# Patient Record
Sex: FEMALE | Race: WHITE | Employment: FULL TIME | URBAN - METROPOLITAN AREA
[De-identification: names, ages, dates, MRNs, and addresses within clinical notes are randomized per-mention and may not be internally consistent; named-entity substitution may affect disease eponyms.]

---

## 2017-01-19 ENCOUNTER — HOSPITAL ENCOUNTER (OUTPATIENT)
Dept: LAB | Age: 62
Discharge: HOME | End: 2017-01-19

## 2017-01-19 PROCEDURE — 88305 TISSUE EXAM BY PATHOLOGIST: CPT | Performed by: INTERNAL MEDICINE

## 2018-09-06 PROBLEM — R06.00 DYSPNEA: Status: ACTIVE | Noted: 2018-09-06

## 2018-09-06 PROBLEM — I10 ESSENTIAL HYPERTENSION WITH GOAL BLOOD PRESSURE LESS THAN 130/85: Status: ACTIVE | Noted: 2018-09-06

## 2018-09-06 PROBLEM — E11.9 TYPE 2 DIABETES MELLITUS WITHOUT COMPLICATION, WITHOUT LONG-TERM CURRENT USE OF INSULIN (HCC): Status: ACTIVE | Noted: 2018-09-06

## 2018-09-06 PROBLEM — R94.31 ABNORMAL EKG: Status: ACTIVE | Noted: 2018-09-06

## 2018-09-06 PROBLEM — E78.2 MIXED HYPERLIPIDEMIA: Status: ACTIVE | Noted: 2018-09-06

## 2018-09-06 PROBLEM — I47.9 PAROXYSMAL TACHYCARDIA (HCC): Status: ACTIVE | Noted: 2018-09-06

## 2018-09-07 ENCOUNTER — HOSPITAL ENCOUNTER (OUTPATIENT)
Dept: LAB | Age: 63
Discharge: HOME | End: 2018-09-07
Payer: COMMERCIAL

## 2018-09-07 DIAGNOSIS — R06.00 DYSPNEA, UNSPECIFIED TYPE: ICD-10-CM

## 2018-09-07 LAB
ANION GAP SERPL CALC-SCNC: 8 MMOL/L (ref 7–16)
BASOPHILS # BLD: 0 K/UL (ref 0–0.2)
BASOPHILS NFR BLD: 1 % (ref 0–2)
BUN SERPL-MCNC: 19 MG/DL (ref 8–23)
CALCIUM SERPL-MCNC: 9.7 MG/DL (ref 8.3–10.4)
CHLORIDE SERPL-SCNC: 106 MMOL/L (ref 98–107)
CO2 SERPL-SCNC: 28 MMOL/L (ref 21–32)
CREAT SERPL-MCNC: 0.79 MG/DL (ref 0.6–1)
DIFFERENTIAL METHOD BLD: ABNORMAL
EOSINOPHIL # BLD: 0.3 K/UL (ref 0–0.8)
EOSINOPHIL NFR BLD: 5 % (ref 0.5–7.8)
ERYTHROCYTE [DISTWIDTH] IN BLOOD BY AUTOMATED COUNT: 14.9 %
GLUCOSE SERPL-MCNC: 104 MG/DL (ref 65–100)
HCT VFR BLD AUTO: 40 % (ref 35.8–46.3)
HGB BLD-MCNC: 12.3 G/DL (ref 11.7–15.4)
IMM GRANULOCYTES # BLD: 0 K/UL (ref 0–0.5)
IMM GRANULOCYTES NFR BLD AUTO: 0 % (ref 0–5)
LYMPHOCYTES # BLD: 2 K/UL (ref 0.5–4.6)
LYMPHOCYTES NFR BLD: 30 % (ref 13–44)
MCH RBC QN AUTO: 27 PG (ref 26.1–32.9)
MCHC RBC AUTO-ENTMCNC: 30.8 G/DL (ref 31.4–35)
MCV RBC AUTO: 87.7 FL (ref 79.6–97.8)
MONOCYTES # BLD: 0.5 K/UL (ref 0.1–1.3)
MONOCYTES NFR BLD: 8 % (ref 4–12)
NEUTS SEG # BLD: 3.6 K/UL (ref 1.7–8.2)
NEUTS SEG NFR BLD: 56 % (ref 43–78)
NRBC # BLD: 0 K/UL (ref 0–0.2)
PLATELET # BLD AUTO: 391 K/UL (ref 150–450)
PMV BLD AUTO: 9.5 FL (ref 9.4–12.3)
POTASSIUM SERPL-SCNC: 4 MMOL/L (ref 3.5–5.1)
RBC # BLD AUTO: 4.56 M/UL (ref 4.05–5.2)
SODIUM SERPL-SCNC: 142 MMOL/L (ref 136–145)
WBC # BLD AUTO: 6.4 K/UL (ref 4.3–11.1)

## 2018-09-07 PROCEDURE — 36415 COLL VENOUS BLD VENIPUNCTURE: CPT

## 2018-09-07 PROCEDURE — 85025 COMPLETE CBC W/AUTO DIFF WBC: CPT

## 2018-09-07 PROCEDURE — 80048 BASIC METABOLIC PNL TOTAL CA: CPT

## 2018-09-11 ENCOUNTER — HOSPITAL ENCOUNTER (OUTPATIENT)
Dept: CARDIAC CATH/INVASIVE PROCEDURES | Age: 63
Setting detail: OBSERVATION
Discharge: HOME | End: 2018-09-12
Attending: INTERNAL MEDICINE | Admitting: INTERNAL MEDICINE
Payer: COMMERCIAL

## 2018-09-11 PROBLEM — I50.22 CHRONIC SYSTOLIC CHF (CONGESTIVE HEART FAILURE) (HCC): Status: ACTIVE | Noted: 2018-09-11

## 2018-09-11 LAB
ACT BLD: 791 SECS (ref 70–128)
ATRIAL RATE: 73 BPM
ATRIAL RATE: 74 BPM
CALCULATED P AXIS, ECG09: 57 DEGREES
CALCULATED P AXIS, ECG09: 64 DEGREES
CALCULATED R AXIS, ECG10: 21 DEGREES
CALCULATED R AXIS, ECG10: 27 DEGREES
CALCULATED T AXIS, ECG11: 106 DEGREES
CALCULATED T AXIS, ECG11: 92 DEGREES
DIAGNOSIS, 93000: NORMAL
DIAGNOSIS, 93000: NORMAL
GLUCOSE BLD STRIP.AUTO-MCNC: 133 MG/DL (ref 65–100)
GLUCOSE BLD STRIP.AUTO-MCNC: 172 MG/DL (ref 65–100)
GLUCOSE BLD STRIP.AUTO-MCNC: 94 MG/DL (ref 65–100)
P-R INTERVAL, ECG05: 160 MS
P-R INTERVAL, ECG05: 178 MS
Q-T INTERVAL, ECG07: 386 MS
Q-T INTERVAL, ECG07: 392 MS
QRS DURATION, ECG06: 94 MS
QRS DURATION, ECG06: 94 MS
QTC CALCULATION (BEZET), ECG08: 425 MS
QTC CALCULATION (BEZET), ECG08: 435 MS
VENTRICULAR RATE, ECG03: 73 BPM
VENTRICULAR RATE, ECG03: 74 BPM

## 2018-09-11 PROCEDURE — C1725 CATH, TRANSLUMIN NON-LASER: HCPCS

## 2018-09-11 PROCEDURE — C1894 INTRO/SHEATH, NON-LASER: HCPCS

## 2018-09-11 PROCEDURE — 74011250637 HC RX REV CODE- 250/637: Performed by: INTERNAL MEDICINE

## 2018-09-11 PROCEDURE — 93572 IV DOP VEL&/PRESS C FLO EA: CPT

## 2018-09-11 PROCEDURE — 93458 L HRT ARTERY/VENTRICLE ANGIO: CPT

## 2018-09-11 PROCEDURE — 74011000258 HC RX REV CODE- 258: Performed by: INTERNAL MEDICINE

## 2018-09-11 PROCEDURE — 77030012468 HC VLV BLEEDBK CNTRL ABBT -B

## 2018-09-11 PROCEDURE — 99152 MOD SED SAME PHYS/QHP 5/>YRS: CPT

## 2018-09-11 PROCEDURE — C1887 CATHETER, GUIDING: HCPCS

## 2018-09-11 PROCEDURE — C1769 GUIDE WIRE: HCPCS

## 2018-09-11 PROCEDURE — 99218 HC RM OBSERVATION: CPT

## 2018-09-11 PROCEDURE — 85347 COAGULATION TIME ACTIVATED: CPT

## 2018-09-11 PROCEDURE — 99153 MOD SED SAME PHYS/QHP EA: CPT

## 2018-09-11 PROCEDURE — 74011636637 HC RX REV CODE- 636/637: Performed by: INTERNAL MEDICINE

## 2018-09-11 PROCEDURE — C1874 STENT, COATED/COV W/DEL SYS: HCPCS

## 2018-09-11 PROCEDURE — 77030013794 HC KT TRNSDUC BLD EDWD -B

## 2018-09-11 PROCEDURE — 77030002888 HC SUT CHRMC J&J -A

## 2018-09-11 PROCEDURE — 77030004559 HC CATH ANGI DX SUPT CARD -B

## 2018-09-11 PROCEDURE — 74011250636 HC RX REV CODE- 250/636

## 2018-09-11 PROCEDURE — 74011250636 HC RX REV CODE- 250/636: Performed by: INTERNAL MEDICINE

## 2018-09-11 PROCEDURE — 74011636320 HC RX REV CODE- 636/320: Performed by: INTERNAL MEDICINE

## 2018-09-11 PROCEDURE — 92928 PRQ TCAT PLMT NTRAC ST 1 LES: CPT

## 2018-09-11 PROCEDURE — 82962 GLUCOSE BLOOD TEST: CPT

## 2018-09-11 PROCEDURE — 74011000250 HC RX REV CODE- 250: Performed by: INTERNAL MEDICINE

## 2018-09-11 PROCEDURE — 93571 IV DOP VEL&/PRESS C FLO 1ST: CPT

## 2018-09-11 PROCEDURE — 77030004558 HC CATH ANGI DX SUPR TORQ CARD -A

## 2018-09-11 PROCEDURE — 93005 ELECTROCARDIOGRAM TRACING: CPT | Performed by: INTERNAL MEDICINE

## 2018-09-11 RX ORDER — HEPARIN SODIUM 200 [USP'U]/100ML
3 INJECTION, SOLUTION INTRAVENOUS CONTINUOUS
Status: DISCONTINUED | OUTPATIENT
Start: 2018-09-11 | End: 2018-09-11

## 2018-09-11 RX ORDER — PRAVASTATIN SODIUM 20 MG/1
20 TABLET ORAL
Status: DISCONTINUED | OUTPATIENT
Start: 2018-09-11 | End: 2018-09-12

## 2018-09-11 RX ORDER — SODIUM CHLORIDE 0.9 % (FLUSH) 0.9 %
5-10 SYRINGE (ML) INJECTION AS NEEDED
Status: DISCONTINUED | OUTPATIENT
Start: 2018-09-11 | End: 2018-09-12 | Stop reason: HOSPADM

## 2018-09-11 RX ORDER — MORPHINE SULFATE 2 MG/ML
2 INJECTION, SOLUTION INTRAMUSCULAR; INTRAVENOUS
Status: DISCONTINUED | OUTPATIENT
Start: 2018-09-11 | End: 2018-09-12 | Stop reason: HOSPADM

## 2018-09-11 RX ORDER — SODIUM CHLORIDE 9 MG/ML
75 INJECTION, SOLUTION INTRAVENOUS CONTINUOUS
Status: DISPENSED | OUTPATIENT
Start: 2018-09-11 | End: 2018-09-12

## 2018-09-11 RX ORDER — INSULIN LISPRO 100 [IU]/ML
INJECTION, SOLUTION INTRAVENOUS; SUBCUTANEOUS
Status: DISCONTINUED | OUTPATIENT
Start: 2018-09-11 | End: 2018-09-12 | Stop reason: HOSPADM

## 2018-09-11 RX ORDER — MIDAZOLAM HYDROCHLORIDE 1 MG/ML
.5-2 INJECTION, SOLUTION INTRAMUSCULAR; INTRAVENOUS
Status: DISCONTINUED | OUTPATIENT
Start: 2018-09-11 | End: 2018-09-11

## 2018-09-11 RX ORDER — GUAIFENESIN 100 MG/5ML
81 LIQUID (ML) ORAL ONCE
Status: ACTIVE | OUTPATIENT
Start: 2018-09-11 | End: 2018-09-12

## 2018-09-11 RX ORDER — CARVEDILOL 3.12 MG/1
3.12 TABLET ORAL 2 TIMES DAILY WITH MEALS
Status: DISCONTINUED | OUTPATIENT
Start: 2018-09-11 | End: 2018-09-12 | Stop reason: HOSPADM

## 2018-09-11 RX ORDER — SODIUM CHLORIDE 0.9 % (FLUSH) 0.9 %
5-10 SYRINGE (ML) INJECTION EVERY 8 HOURS
Status: DISCONTINUED | OUTPATIENT
Start: 2018-09-11 | End: 2018-09-12 | Stop reason: HOSPADM

## 2018-09-11 RX ORDER — FENTANYL CITRATE 50 UG/ML
25-100 INJECTION, SOLUTION INTRAMUSCULAR; INTRAVENOUS
Status: DISCONTINUED | OUTPATIENT
Start: 2018-09-11 | End: 2018-09-11

## 2018-09-11 RX ORDER — NITROGLYCERIN 0.4 MG/1
0.4 TABLET SUBLINGUAL
Status: DISCONTINUED | OUTPATIENT
Start: 2018-09-11 | End: 2018-09-12 | Stop reason: HOSPADM

## 2018-09-11 RX ORDER — DIAZEPAM 5 MG/1
5 TABLET ORAL AS NEEDED
Status: DISCONTINUED | OUTPATIENT
Start: 2018-09-11 | End: 2018-09-12 | Stop reason: HOSPADM

## 2018-09-11 RX ORDER — LORAZEPAM 1 MG/1
1 TABLET ORAL
Status: DISCONTINUED | OUTPATIENT
Start: 2018-09-11 | End: 2018-09-12 | Stop reason: HOSPADM

## 2018-09-11 RX ORDER — LIDOCAINE HYDROCHLORIDE 10 MG/ML
20-300 INJECTION INFILTRATION; PERINEURAL
Status: DISCONTINUED | OUTPATIENT
Start: 2018-09-11 | End: 2018-09-11

## 2018-09-11 RX ORDER — SODIUM CHLORIDE 9 MG/ML
75 INJECTION, SOLUTION INTRAVENOUS CONTINUOUS
Status: DISCONTINUED | OUTPATIENT
Start: 2018-09-11 | End: 2018-09-11 | Stop reason: SDUPTHER

## 2018-09-11 RX ORDER — PRAVASTATIN SODIUM 20 MG/1
40 TABLET ORAL
Status: DISCONTINUED | OUTPATIENT
Start: 2018-09-11 | End: 2018-09-11 | Stop reason: SDUPTHER

## 2018-09-11 RX ORDER — ACETAMINOPHEN 325 MG/1
650 TABLET ORAL
Status: DISCONTINUED | OUTPATIENT
Start: 2018-09-11 | End: 2018-09-12 | Stop reason: HOSPADM

## 2018-09-11 RX ORDER — ATROPINE SULFATE 0.1 MG/ML
INJECTION INTRAVENOUS
Status: ACTIVE
Start: 2018-09-11 | End: 2018-09-12

## 2018-09-11 RX ORDER — LISINOPRIL 5 MG/1
5 TABLET ORAL 2 TIMES DAILY
Status: DISCONTINUED | OUTPATIENT
Start: 2018-09-11 | End: 2018-09-12 | Stop reason: HOSPADM

## 2018-09-11 RX ORDER — GUAIFENESIN 100 MG/5ML
81 LIQUID (ML) ORAL DAILY
Status: DISCONTINUED | OUTPATIENT
Start: 2018-09-12 | End: 2018-09-12 | Stop reason: HOSPADM

## 2018-09-11 RX ADMIN — Medication 10 ML: at 22:23

## 2018-09-11 RX ADMIN — BIVALIRUDIN 1.75 MG/KG/HR: 250 INJECTION, POWDER, LYOPHILIZED, FOR SOLUTION INTRAVENOUS at 13:59

## 2018-09-11 RX ADMIN — ACETAMINOPHEN 650 MG: 325 TABLET ORAL at 23:30

## 2018-09-11 RX ADMIN — PRAVASTATIN SODIUM 20 MG: 20 TABLET ORAL at 22:24

## 2018-09-11 RX ADMIN — LISINOPRIL 5 MG: 5 TABLET ORAL at 17:38

## 2018-09-11 RX ADMIN — SODIUM CHLORIDE 75 ML/HR: 900 INJECTION, SOLUTION INTRAVENOUS at 12:25

## 2018-09-11 RX ADMIN — INSULIN LISPRO 3 UNITS: 100 INJECTION, SOLUTION INTRAVENOUS; SUBCUTANEOUS at 22:23

## 2018-09-11 RX ADMIN — TICAGRELOR 180 MG: 90 TABLET ORAL at 14:32

## 2018-09-11 RX ADMIN — NITROGLYCERIN 0.15 MG: 200 INJECTION, SOLUTION INTRAVENOUS at 14:10

## 2018-09-11 RX ADMIN — NITROGLYCERIN 0.15 MG: 200 INJECTION, SOLUTION INTRAVENOUS at 14:01

## 2018-09-11 RX ADMIN — FENTANYL CITRATE 25 MCG: 50 INJECTION, SOLUTION INTRAMUSCULAR; INTRAVENOUS at 13:38

## 2018-09-11 RX ADMIN — LIDOCAINE HYDROCHLORIDE 7 ML: 10 INJECTION, SOLUTION INFILTRATION; PERINEURAL at 13:37

## 2018-09-11 RX ADMIN — CARVEDILOL 3.12 MG: 3.12 TABLET, FILM COATED ORAL at 17:38

## 2018-09-11 RX ADMIN — IOPAMIDOL 220 ML: 755 INJECTION, SOLUTION INTRAVENOUS at 14:31

## 2018-09-11 RX ADMIN — FENTANYL CITRATE 25 MCG: 50 INJECTION, SOLUTION INTRAMUSCULAR; INTRAVENOUS at 14:00

## 2018-09-11 RX ADMIN — MIDAZOLAM HYDROCHLORIDE 1 MG: 1 INJECTION, SOLUTION INTRAMUSCULAR; INTRAVENOUS at 13:38

## 2018-09-11 RX ADMIN — MIDAZOLAM HYDROCHLORIDE 1 MG: 1 INJECTION, SOLUTION INTRAMUSCULAR; INTRAVENOUS at 13:30

## 2018-09-11 RX ADMIN — FENTANYL CITRATE 25 MCG: 50 INJECTION, SOLUTION INTRAMUSCULAR; INTRAVENOUS at 13:30

## 2018-09-11 RX ADMIN — HEPARIN SODIUM 3 ML/HR: 5000 INJECTION, SOLUTION INTRAVENOUS; SUBCUTANEOUS at 13:15

## 2018-09-11 NOTE — PROGRESS NOTES
Patient pre-assessment complete for [facility] poss with DR Choi scheduled for 18 at 2pm, arrival time 12pm. Patient verified using . Patient instructed to bring all home medications in labeled bottles on the day of procedure. NPO status reinforced. Patient informed to take a full dose aspirin 325mg  or 81 mg x 4 on the day of procedure. Patient instructed to HOLD metformin (last dose 9/10/18am) & HOLD HCTZ in am . Instructed they can take all other medications excluding vitamins & supplements. Patient verbalizes understanding of all instructions & denies any questions at this time.

## 2018-09-11 NOTE — PROGRESS NOTES
TRANSFER - OUT REPORT: 
 
Verbal report given to TING Polk(name) on Alessia Banda  being transferred to telemetry room 326(unit) for routine progression of care Report consisted of patients Situation, Background, Assessment and  
Recommendations(SBAR). Information from the following report(s) Procedure Summary was reviewed with the receiving nurse. Lines:  
Peripheral IV 09/11/18 Left Antecubital (Active) Peripheral IV 09/11/18 Right Antecubital (Active) Opportunity for questions and clarification was provided. Patient transported with: 
 Monitor

## 2018-09-11 NOTE — PROGRESS NOTES
R posterior tibial pulse was +1 and R dorsalis pedis pulse was +1 prior to sheath removal. 6FR arterial sheath removed from R groin using sterile technique at 1645. Manual pressure held over site for 20 minutes minutes. Hemostasis achieved at 1705. R groin without bleeding without hematoma. Sterile gauze placed over site and secured with tegaderm. 5lb sandbag placed over site x 4 hours. Patient instructed to keep R leg straight and still and head on pillow. Patient verbalizes understanding.   R posterior tibial pulse was +1 and R dorsalis pedis pulse was +1 after sheath removal.

## 2018-09-11 NOTE — PROGRESS NOTES
TRANSFER - IN REPORT: 
 
Verbal report received from TING Arora(name) on Golden Sawant  being received from cath lab(unit) for routine progression of care Report consisted of patients Situation, Background, Assessment and  
Recommendations(SBAR). Information from the following report(s) Procedure Summary was reviewed with the receiving nurse. Opportunity for questions and clarification was provided. Assessment completed upon patients arrival to unit and care assumed.

## 2018-09-11 NOTE — PROGRESS NOTES
TRANSFER - OUT REPORT: 
 
Verbal report given to Hyacinth Arevalo RN (name) on Job Li  being [place] [address] [place] [address] [place] [place] Report consisted of patients Situation, Background, Assessment and  
Recommendations(SBAR). Information from the following report(s) SBAR was reviewed with the receiving nurse. Lines:  
Peripheral IV 09/11/18 Left Antecubital (Active) Peripheral IV 09/11/18 Right Antecubital (Active) Opportunity for questions and clarification was provided. Patient transported with: 
 [place] Pt had LHC via RFA. 1 stent placed to ramus and iFR wire utilized. 6fr sheath remains sutured in place and attached to art line. Covered with tegaderm. Pt received Versed 2mg IV, Fentanyl 75mcg IV, Angoimax bolus IV, Angoimax gtt IV ending @ 1436, and Brilinta 180mg PO.

## 2018-09-11 NOTE — PROGRESS NOTES
Patient received to [address] [place] [address] room # 9  Ambulatory from [facility]. Patient scheduled for [facility] today with Dr Fahad Thornton. Procedure reviewed & questions answered, voiced good understanding consent obtained & placed on chart. All medications and medical history reviewed. Will prep patient per orders. Patient & family updated on plan of care. The patient has a fraility score of 3-MANAGING WELL, based on reports recent fatigue

## 2018-09-11 NOTE — PROCEDURES
Brief Cardiac Procedure Note    Patient: Cookie Prescott MRN: [id number]  SSN: [id number]    YOB: 1955  Age: 58 y.o. Sex: female      Date of Procedure: 9/11/2018     Pre-procedure Diagnosis: Congestive Heart Failure    Post-procedure Diagnosis: Coronary Artery Disease    Reason for Procedure: Suspected CAD    Procedure: Left Heart Catheterization with Percutaneous Coronary Intervention    Brief Description of Procedure: IFR/PCI RAMUS    Performed By: Hilaria Morales MD     Assistants: NONE    Anesthesia: Moderate Sedation    Estimated Blood Loss: Less than 10 mL      Specimens: None    Implants: None    Findings:   LV 30%, GLOBAL HK  RAMUS LONG PROX/MID 60% --- iFR 0.87 --- 3.0 X 30 BARBARA, 3.0NC TO 16, 16, 14  LAD MID SMOOTH 50% FOCAL, ---iFR 0.97  LCX AND RCA MILD IRREGS  RIGHT GROIN  MANUAL    Complications: None    Recommendations: Continue medical therapy.     Signed By: Hilaria Morales MD     September 11, 2018

## 2018-09-11 NOTE — PROGRESS NOTES
made initial visit. Pt was alert and verbal.  Pt appeared comfortable with no pain level expressed or observed. Pt's family was present.  welcomed them to DT and shared information about  services.  provided spiritual care through presence, pastoral conversation, and assurance of prayer.

## 2018-09-11 NOTE — IP AVS SNAPSHOT
[address] 
[phone] Patient: Katerin Slade MRN: [id number] :1955 About your hospitalization You were admitted on:  2018 You last received care in the:  [facility] 3 TELEMETRY You were discharged on:  2018 Why you were hospitalized Your primary diagnosis was:  Not on File Your diagnoses also included:  Chronic Systolic Chf (Congestive Heart Failure) (Hcc) Follow-up Information Follow up With Details Comments Contact Info [facility]  We will forward your outpatient referral for Cardiac Rehab to the [facility] as requested. [address] Candy Patel [phone] Kacey Zavaleta MD On 2018 TC-7 at 330 pm in [facility] office  [facility] [address]  [address] [place] [id number] 
[phone] Ann-Marie Thompson MD   [address] 
[phone] Your Scheduled Appointments 2018  3:30 PM EDT TRANSITIONAL CARE MANAGEMENT with Kacey Zavaleta MD  
[facility] ([address] [place] [address]) [address] [place] [address]  
[phone] Discharge Orders Procedure Order Date Status Priority Quantity Spec Type Associated Dx REFERRAL TO CARDIAC REHAB 18 1019 Normal Routine 1 A check yassine indicates which time of day the medication should be taken. My Medications START taking these medications Instructions Each Dose to Equal  
 Morning Noon Evening Bedtime  
 carvedilol 3.125 mg tablet Commonly known as:  Pablito Pintos Take 1 Tab by mouth two (2) times daily (with meals). 3.125 mg  
    
  
   
   
  
   
  
 lisinopril 5 mg tablet Commonly known as:  Tung Cruz Take 1 Tab by mouth two (2) times a day. 5 mg nitroglycerin 0.4 mg SL tablet Commonly known as:  NITROSTAT  
   
 1 Tab by SubLINGual route every five (5) minutes as needed for Chest Pain. Up to 3 doses. 0.4 mg  
    
   
   
   
  
 ticagrelor 90 mg tablet Commonly known as:  [employer] Take 1 Tab by mouth every twelve (12) hours every twelve (12) hours. 90 mg CONTINUE taking these medications Instructions Each Dose to Equal  
 Morning Noon Evening Bedtime  
 aspirin delayed-release 81 mg tablet Take 81 mg by mouth two (2) times a day. 81 mg  
    
  
   
   
  
   
  
 GLUCOPHAGE 1,000 mg tablet Generic drug:  metFORMIN Take 1,000 mg by mouth two (2) times daily (with meals). 1000 mg  
    
  
   
   
  
   
  
 pravastatin 40 mg tablet Commonly known as:  PRAVACHOL Take 40 mg by mouth nightly. 40 mg PRENATAL MULTI PO Take  by mouth daily. STOOL SOFTENER 100 mg capsule Generic drug:  docusate sodium Take 100 mg by mouth two (2) times a day. 100 mg  
    
  
   
   
  
   
  
 traMADol 50 mg tablet Commonly known as:  ULTRAM  
   
 Take 50 mg by mouth two (2) times a day. 50 mg  
    
  
   
   
  
   
  
  
STOP taking these medications   
 hydroCHLOROthiazide 25 mg tablet Commonly known as:  HYDRODIURIL  
   
  
 metoprolol tartrate 25 mg tablet Commonly known as:  LOPRESSOR Where to Get Your Medications Information on where to get these meds will be given to you by the nurse or doctor. ! Ask your nurse or doctor about these medications  
  carvedilol 3.125 mg tablet  
 lisinopril 5 mg tablet  
 nitroglycerin 0.4 mg SL tablet  
 ticagrelor 90 mg tablet Opioid Education  Prescription Opioids: What You Need to Know: 
 
Prescription opioids can be used to help relieve moderate-to-severe pain and are often prescribed following a surgery or injury, or for certain health conditions. These medications can be an important part of treatment but also come with serious risks. Opioids are strong pain medicines. Examples include hydrocodone, oxycodone, fentanyl, and morphine. Heroin is an example of an illegal opioid. It is important to work with your health care provider to make sure you are getting the safest, most effective care. WHAT ARE THE RISKS AND SIDE EFFECTS OF OPIOID USE? Prescription opioids carry serious risks of addiction and overdose, especially with prolonged use. An opioid overdose, often marked by slow breathing, can cause sudden death. The use of prescription opioids can have a number of side effects as well, even when taken as directed. · Tolerance-meaning you might need to take more of a medication for the same pain relief · Physical dependence-meaning you have symptoms of withdrawal when the medication is stopped. Withdrawal symptoms can include nausea, sweating, chills, diarrhea, stomach cramps, and muscle aches. Withdrawal can last up to several weeks, depending on which drug you took and how long you took it. · Increased sensitivity to pain · Constipation · Nausea, vomiting, and dry mouth · Sleepiness and dizziness · Confusion · Depression · Low levels of testosterone that can result in lower sex drive, energy, and strength · Itching and sweating RISKS ARE GREATER WITH:      
· History of drug misuse, substance use disorder, or overdose · Mental health conditions (such as depression or anxiety) · Sleep apnea · Older age (72 years or older) · Pregnancy Avoid alcohol while taking prescription opioids. Also, unless specifically advised by your health care provider, medications to avoid include: · Benzodiazepines (such as Xanax or Valium) · Muscle relaxants (such as Soma or Flexeril) · Hypnotics (such as Ambien or Lunesta) · Other prescription opioids KNOW YOUR OPTIONS Talk to your health care provider about ways to manage your pain that don't involve prescription opioids. Some of these options may actually work better and have fewer risks and side effects. Options may include: 
· Pain relievers such as acetaminophen, ibuprofen, and naproxen · Some medications that are also used for depression or seizures · Physical therapy and exercise · Counseling to help patients learn how to cope better with triggers of pain and stress. · Application of heat or cold compress · Massage therapy · Relaxation techniques Be Informed Make sure you know the name of your medication, how much and how often to take it, and its potential risks & side effects. IF YOU ARE PRESCRIBED OPIOIDS FOR PAIN: 
· Never take opioids in greater amounts or more often than prescribed. Remember the goal is not to be pain-free but to manage your pain at a tolerable level. · Follow up with your primary care provider to: · Work together to create a plan on how to manage your pain. · Talk about ways to help manage your pain that don't involve prescription opioids. · Talk about any and all concerns and side effects. · Help prevent misuse and abuse. · Never sell or share prescription opioids · Help prevent misuse and abuse. · Store prescription opioids in a secure place and out of reach of others (this may include visitors, children, friends, and family). · Safely dispose of unused/unwanted prescription opioids: Find your community drug take-back program or your pharmacy mail-back program, or flush them down the toilet, following guidance from the Food and Drug Administration (www.fda.gov/Drugs/ResourcesForYou). · Visit www.cdc.gov/drugoverdose to learn about the risks of opioid abuse and overdose. · If you believe you may be struggling with addiction, tell your health care provider and ask for guidance or call [employer] at [phone]. Discharge Instructions Resume metformin on 9/14/18 DISPOSITION: The patient is being discharged home in stable condition on a low saturated fat, low cholesterol and low salt diet. The patient is instructed to advance activities as tolerated to the limit of fatigue or shortness of breath. The patient is instructed to avoid all heavy lifting, straining, stooping or squatting for 3-5 days. The patient is instructed to watch the cath site for bleeding/oozing; if seen, the patient is instructed to apply firm pressure with a clean cloth and call [address] [place] [address] 121 Cardiology at [phone]. The patient is instructed to watch for signs of infection which include: increasing area of redness, fever/hot to touch or purulent drainage at the catheterization site. The patient is instructed not to soak in a bathtub for 7-10 days, but is cleared to shower. The patient is instructed to call the office or return to the ER for immediate evaluation for any shortness of breath or chest pain not relieved by NTG. Percutaneous Coronary Intervention: What to Expect at [facility] Your Recovery Percutaneous coronary intervention (PCI) is the name for procedures that are used to open a narrowed or blocked coronary artery. The two most common PCI procedures are coronary angioplasty and coronary stent placement. Your groin or arm may have a bruise and feel sore for a day or two after a percutaneous coronary intervention (PCI). You can do light activities around the house, but nothing strenuous for several days. This care sheet gives you a general idea about how long it will take for you to recover. But each person recovers at a different pace. Follow the steps below to get better as quickly as possible. How can you care for yourself at home? Activity 
  · If the doctor gave you a sedative: ¨ For 24 hours, don't do anything that requires attention to detail.  It takes time for the medicine's effects to completely wear off. ¨ For your safety, do not drive or operate any machinery that could be dangerous. Wait until the medicine wears off and you can think clearly and react easily.  
  · Do not do strenuous exercise and do not lift, pull, or push anything heavy until your doctor says it is okay. This may be for a day or two. You can walk around the house and do light activity, such as cooking.  
  · If the catheter was placed in your groin, try not to walk up stairs for the first couple of days.  
  · If the catheter was placed in your arm near your wrist, do not bend your wrist deeply for the first couple of days. Be careful using your hand to get into and out of a chair or bed.  
  · Carry your stent identification card with you at all times.  
  · If your doctor recommends it, get more exercise. Walking is a good choice. Bit by bit, increase the amount you walk every day. Try for at least 30 minutes on most days of the week. Diet 
  · Drink plenty of fluids to help your body flush out the dye. If you have kidney, heart, or liver disease and have to limit fluids, talk with your doctor before you increase the amount of fluids you drink.  
  · Keep eating a heart-healthy diet that has lots of fruits, vegetables, and whole grains. If you have not been eating this way, talk to your doctor. You also may want to talk to a dietitian. This expert can help you to learn about healthy foods and plan meals. Medicines 
  · Your doctor will tell you if and when you can restart your medicines. He or she will also give you instructions about taking any new medicines.  
  · If you take blood thinners, such as warfarin (Coumadin), clopidogrel (Plavix), or aspirin, be sure to talk to your doctor. He or she will tell you if and when to start taking those medicines again. Make sure that you understand exactly what your doctor wants you to do.   · Your doctor will prescribe blood-thinning medicines. You will likely take aspirin plus another antiplatelet, such as clopidogrel (Plavix). It is very important that you take these medicines exactly as directed. These medicines help keep the coronary artery open and reduce your risk of a heart attack.  
  · Call your doctor if you think you are having a problem with your medicine.  
 Care of the catheter site 
  · For 1 or 2 days, keep a bandage over the spot where the catheter was inserted. The bandage probably will fall off in this time.  
  · Put ice or a cold pack on the area for 10 to 20 minutes at a time to help with soreness or swelling. Put a thin cloth between the ice and your skin.  
  · You may shower 24 to 48 hours after the procedure, if your doctor okays it. Pat the incision dry.  
  · Do not soak the catheter site until it is healed. Don't take a bath for 1 week, or until your doctor tells you it is okay.  
  · If you are bleeding, lie down and press on the area for 15 minutes to try to make it stop. If the bleeding does not stop, call your doctor or seek immediate medical care. Follow-up care is a key part of your treatment and safety. Be sure to make and go to all appointments, and call your doctor if you are having problems. It's also a good idea to know your test results and keep a list of the medicines you take. When should you call for help? Call 911 anytime you think you may need emergency care. For example, call if: 
  · You passed out (lost consciousness).  
  · You have severe trouble breathing.  
  · You have sudden chest pain and shortness of breath, or you cough up blood.  
  · You have symptoms of a heart attack, such as: ¨ Chest pain or pressure. ¨ Sweating. ¨ Shortness of breath. ¨ Nausea or vomiting. ¨ Pain that spreads from the chest to the neck, jaw, or one or both shoulders or arms. ¨ Dizziness or lightheadedness. ¨ A fast or uneven pulse. After calling 911, chew 1 adult-strength aspirin. Wait for an ambulance. Do not try to drive yourself.  
  · You have been diagnosed with angina, and you have angina symptoms that do not go away with rest or are not getting better within 5 minutes after you take one dose of nitroglycerin.  
 Call your doctor now or seek immediate medical care if: 
  · You are bleeding from the area where the catheter was put in your artery.  
  · You have a fast-growing, painful lump at the catheter site.  
  · You have signs of infection, such as: 
¨ Increased pain, swelling, warmth, or redness. ¨ Red streaks leading from the catheter site. ¨ Pus draining from the catheter site. ¨ A fever.  
  · Your leg or arm looks blue or feels cold, numb, or tingly.  
 Watch closely for changes in your health, and be sure to contact your doctor if you have any problems. Where can you learn more? Go to http://sheri-reynaldo.info/. Enter D680 in the search box to learn more about \"Percutaneous Coronary Intervention: What to Expect at Home. \" Current as of: December 6, 2017 Content Version: 11.7 © 0864-1890 [employer]. Care instructions adapted under license by [employer] (which disclaims liability or warranty for this information). If you have questions about a medical condition or this instruction, always ask your healthcare professional. [address] any warranty or liability for your use of this information. Cardiac Catheterization/Angiography Discharge Instructions *Check the puncture site frequently for swelling or bleeding. If you see any bleeding, lie down and apply pressure over the area with a clean town or washcloth. Notify your doctor for any redness, swelling, drainage or oozing from the puncture site. Notify your doctor for any fever or chills.  
 
*If the leg or arm with the puncture becomes cold, numb or painful, call [facility]iology at  [phone]. *Activity should be limited for the next 48 hours. Climb stairs as little as possible and avoid any stooping, bending or strenuous activity for 48 hours. No heavy lifting (anything over 10 pounds) for three days. *Do not drive for 48 hours. *You may resume your usual diet. Drink more fluids than usual. 
 
*Have a responsible person drive you home and stay with you for at least 24 hours after your heart catheterization/angiography. *You may remove the bandage from your Right Arm in 24 hours. You may shower in 24 hours. No tub baths, hot tubs or swimming for one week. Do not place any lotions, creams, powders, ointments over the puncture site for one week. You may place a clean band-aid over the puncture site each day for 5 days. Change this daily. Heart Failure: Care Instructions Your Care Instructions Heart failure occurs when your heart does not pump as much blood as the body needs. Failure does not mean that the heart has stopped pumping but rather that it is not pumping as well as it should. Over time, this causes fluid buildup in your lungs and other parts of your body. Fluid buildup can cause shortness of breath, fatigue, swollen ankles, and other problems. By taking medicines regularly, reducing sodium (salt) in your diet, checking your weight every day, and making lifestyle changes, you can feel better and live longer. Follow-up care is a key part of your treatment and safety. Be sure to make and go to all appointments, and call your doctor if you are having problems. It's also a good idea to know your test results and keep a list of the medicines you take. How can you care for yourself at home? Medicines 
  · Be safe with medicines. Take your medicines exactly as prescribed.  Call your doctor if you think you are having a problem with your medicine.  
  · Do not take any vitamins, over-the-counter medicine, or herbal products without talking to your doctor first. Charlotte Valencia not take ibuprofen (Advil or Motrin) and naproxen (Aleve) without talking to your doctor first. They could make your heart failure worse.  
  · You may be taking some of the following medicine. ¨ Beta-blockers can slow heart rate, decrease blood pressure, and improve your condition. Taking a beta-blocker may lower your chance of needing to be hospitalized. ¨ Angiotensin-converting enzyme inhibitors (ACEIs) reduce the heart's workload, lower blood pressure, and reduce swelling. Taking an ACEI may lower your chance of needing to be hospitalized again. ¨ Angiotensin II receptor blockers (ARBs) work like ACEIs. Your doctor may prescribe them instead of ACEIs. ¨ Diuretics, also called water pills, reduce swelling. ¨ Potassium supplements replace this important mineral, which is sometimes lost with diuretics. ¨ Aspirin and other blood thinners prevent blood clots, which can cause a stroke or heart attack.  
 You will get more details on the specific medicines your doctor prescribes. Diet 
  · Your doctor may suggest that you limit sodium to 2,000 milligrams (mg) a day or less. That is less than 1 teaspoon of salt a day, including all the salt you eat in cooking or in packaged foods. People get most of their sodium from processed foods. Fast food and restaurant meals also tend to be very high in sodium.  
  · Ask your doctor how much liquid you can drink each day. You may have to limit liquids.  
 Weight 
  · Weigh yourself without clothing at the same time each day. Record your weight. Call your doctor if you have a sudden weight gain, such as more than 2 to 3 pounds in a day or 5 pounds in a week. (Your doctor may suggest a different range of weight gain.) A sudden weight gain may mean that your heart failure is getting worse.  
 Activity level 
  · Start light exercise (if your doctor says it is okay).  Even if you can only do a small amount, exercise will help you get stronger, have more energy, and manage your weight and your stress. Walking is an easy way to get exercise. Start out by walking a little more than you did before. Bit by bit, increase the amount you walk.  
  · When you exercise, watch for signs that your heart is working too hard. You are pushing yourself too hard if you cannot talk while you are exercising. If you become short of breath or dizzy or have chest pain, stop, sit down, and rest.  
  · If you feel \"wiped out\" the day after you exercise, walk slower or for a shorter distance until you can work up to a better pace.  
  · Get enough rest at night. Sleeping with 1 or 2 pillows under your upper body and head may help you breathe easier.  
 Lifestyle changes 
  · Do not smoke. Smoking can make a heart condition worse. If you need help quitting, talk to your doctor about stop-smoking programs and medicines. These can increase your chances of quitting for good. Quitting smoking may be the most important step you can take to protect your heart.  
  · Limit alcohol to 2 drinks a day for men and 1 drink a day for women. Too much alcohol can cause health problems.  
  · Avoid getting sick from colds and the flu. Get a pneumococcal vaccine shot. If you have had one before, ask your doctor whether you need another dose. Get a flu shot each year. If you must be around people with colds or the flu, wash your hands often. When should you call for help?  
Call 911 if you have symptoms of sudden heart failure such as: 
  · You have severe trouble breathing.  
  · You cough up pink, foamy mucus.  
  · You have a new irregular or rapid heartbeat.  
 Call your doctor now or seek immediate medical care if: 
  · You have new or increased shortness of breath.  
  · You are dizzy or lightheaded, or you feel like you may faint.  
  · You have sudden weight gain, such as more than 2 to 3 pounds in a day or 5 pounds in a week. (Your doctor may suggest a different range of weight gain.)  
  · You have increased swelling in your legs, ankles, or feet.  
  · You are suddenly so tired or weak that you cannot do your usual activities.  
 Watch closely for changes in your health, and be sure to contact your doctor if you develop new symptoms. Avoiding Triggers With Heart Failure: Care Instructions Your Care Instructions Triggers are anything that make your heart failure flare up. A flare-up is also called \"sudden heart failure\" or \"acute heart failure. \" When you have a flare-up, fluid builds up in your lungs, and you have problems breathing. You might need to go to the hospital. By watching for changes in your condition and avoiding triggers, you can prevent heart failure flare-ups. Follow-up care is a key part of your treatment and safety. Be sure to make and go to all appointments, and call your doctor if you are having problems. It's also a good idea to know your test results and keep a list of the medicines you take. How can you care for yourself at home? Watch for changes in your weight and condition · Weigh yourself without clothing at the same time each day. Record your weight. Call your doctor if you have sudden weight gain, such as more than 2 to 3 pounds in a day or 5 pounds in a week. (Your doctor may suggest a different range of weight gain.) A sudden weight gain may mean that your heart failure is getting worse. · Keep a daily record of your symptoms. Write down any changes in how you feel, such as new shortness of breath, cough, or problems eating. Also record if your ankles are more swollen than usual and if you feel more tired than usual. Note anything that you ate or did that could have triggered these changes. Limit sodium Sodium causes your body to hold on to extra water. This may cause your heart failure symptoms to get worse.  People get most of their sodium from processed foods. Fast food and restaurant meals also tend to be very high in sodium. · Your doctor may suggest that you limit sodium to 2,000 milligrams (mg) a day or less. That is less than 1 teaspoon of salt a day, including all the salt you eat in cooking or in packaged foods. · Read food labels on cans and food packages. They tell you how much sodium you get in one serving. Check the serving size. If you eat more than one serving, you are getting more sodium. · Be aware that sodium can come in forms other than salt, including monosodium glutamate (MSG), sodium citrate, and sodium bicarbonate (baking soda). MSG is often added to Asian food. You can sometimes ask for food without MSG or salt. · Slowly reducing salt will help you adjust to the taste. Take the salt shaker off the table. · Flavor your food with garlic, lemon juice, onion, vinegar, herbs, and spices instead of salt. Do not use soy sauce, steak sauce, onion salt, garlic salt, mustard, or ketchup on your food, unless it is labeled \"low-sodium\" or \"low-salt. \" 
· Make your own salad dressings, sauces, and ketchup without adding salt. · Use fresh or frozen ingredients, instead of canned ones, whenever you can. Choose low-sodium canned goods. · Eat less processed food and food from restaurants, including fast food. Exercise as directed Moderate, regular exercise is very good for your heart. It improves your blood flow and helps control your weight. But too much exercise can stress your heart and cause a heart failure flare-up. · Check with your doctor before you start an exercise program. 
· Walking is an easy way to get exercise. Start out slowly. Gradually increase the length and pace of your walk. Swimming, riding a bike, and using a treadmill are also good forms of exercise. · When you exercise, watch for signs that your heart is working too hard.  You are pushing yourself too hard if you cannot talk while you are exercising. If you become short of breath or dizzy or have chest pain, stop, sit down, and rest. 
· Do not exercise when you do not feel well. Take medicines correctly · Take your medicines exactly as prescribed. Call your doctor if you think you are having a problem with your medicine. · Make a list of all the medicines you take. Include those prescribed to you by other doctors and any over-the-counter medicines, vitamins, or supplements you take. Take this list with you when you go to any doctor. · Take your medicines at the same time every day. It may help you to post a list of all the medicines you take every day and what time of day you take them. · Make taking your medicine as simple as you can. Plan times to take your medicines when you are doing other things, such as eating a meal or getting ready for bed. This will make it easier to remember to take your medicines. · Get organized. Use helpful tools, such as daily or weekly pill containers. When should you call for help? Call 911 if you have symptoms of sudden heart failure such as: 
  · You have severe trouble breathing.  
  · You cough up pink, foamy mucus.  
  · You have a new irregular or rapid heartbeat.  
 Call your doctor now or seek immediate medical care if: 
  · You have new or increased shortness of breath.  
  · You are dizzy or lightheaded, or you feel like you may faint.  
  · You have sudden weight gain, such as more than 2 to 3 pounds in a day or 5 pounds in a week. (Your doctor may suggest a different range of weight gain.)  
  · You have increased swelling in your legs, ankles, or feet.  
  · You are suddenly so tired or weak that you cannot do your usual activities.  
 Watch closely for changes in your health, and be sure to contact your doctor if you develop new symptoms. Where can you learn more? Go to http://sheri-reynaldo.info/.  
Enter E837 in the search box to learn more about \"Avoiding Triggers With Heart Failure: Care Instructions. \" Current as of: December 6, 2017 Content Version: 11.7 © 9610-2794 [employer]. Care instructions adapted under license by [employer] (which disclaims liability or warranty for this information). If you have questions about a medical condition or this instruction, always ask your healthcare professional. [address] any warranty or liability for your use of this information. Heart-Healthy Diet: Care Instructions Your Care Instructions A heart-healthy diet has lots of vegetables, fruits, nuts, beans, and whole grains, and is low in salt. It limits foods that are high in saturated fat, such as meats, cheeses, and fried foods. It may be hard to change your diet, but even small changes can lower your risk of heart attack and heart disease. Follow-up care is a key part of your treatment and safety. Be sure to make and go to all appointments, and call your doctor if you are having problems. It's also a good idea to know your test results and keep a list of the medicines you take. How can you care for yourself at home? Watch your portions · Learn what a serving is. A \"serving\" and a \"portion\" are not always the same thing. Make sure that you are not eating larger portions than are recommended. For example, a serving of pasta is ½ cup. A serving size of meat is 2 to 3 ounces. A 3-ounce serving is about the size of a deck of cards. Measure serving sizes until you are good at [place]" them. Keep in mind that restaurants often serve portions that are 2 or 3 times the size of one serving. · To keep your energy level up and keep you from feeling hungry, eat often but in smaller portions. · Eat only the number of calories you need to stay at a healthy weight. If you need to lose weight, eat fewer calories than your body burns (through exercise and other physical activity). Eat more fruits and vegetables · Eat a variety of fruit and vegetables every day. Dark green, deep orange, red, or yellow fruits and vegetables are especially good for you. Examples include spinach, carrots, peaches, and berries. · Keep carrots, celery, and other veggies handy for snacks. Buy fruit that is in season and store it where you can see it so that you will be tempted to eat it. · Cook dishes that have a lot of veggies in them, such as stir-fries and soups. Limit saturated and trans fat · Read food labels, and try to avoid saturated and trans fats. They increase your risk of heart disease. Trans fat is found in many processed foods such as cookies and crackers. · Use olive or canola oil when you cook. Try cholesterol-lowering spreads, such as Benecol or Take Control. · Bake, broil, grill, or steam foods instead of frying them. · Choose lean meats instead of high-fat meats such as hot dogs and sausages. Cut off all visible fat when you prepare meat. · Eat fish, skinless poultry, and meat alternatives such as soy products instead of high-fat meats. Soy products, such as tofu, may be especially good for your heart. · Choose low-fat or fat-free milk and dairy products. Eat fish · Eat at least two servings of fish a week. Certain fish, such as salmon and tuna, contain omega-3 fatty acids, which may help reduce your risk of heart attack. Eat foods high in fiber · Eat a variety of grain products every day. Include whole-grain foods that have lots of fiber and nutrients. Examples of whole-grain foods include oats, whole wheat bread, and brown rice. · Buy whole-grain breads and cereals, instead of white bread or pastries. Limit salt and sodium · Limit how much salt and sodium you eat to help lower your blood pressure. · Taste food before you salt it. Add only a little salt when you think you need it. With time, your taste buds will adjust to less salt. · Eat fewer snack items, fast foods, and other high-salt, processed foods. Check food labels for the amount of sodium in packaged foods. · Choose low-sodium versions of canned goods (such as soups, vegetables, and beans). Limit sugar · Limit drinks and foods with added sugar. These include candy, desserts, and soda pop. Limit alcohol · Limit alcohol to no more than 2 drinks a day for men and 1 drink a day for women. Too much alcohol can cause health problems. When should you call for help? Watch closely for changes in your health, and be sure to contact your doctor if: 
  · You would like help planning heart-healthy meals. Where can you learn more? Go to http://sheri-reynaldo.info/. Enter V137 in the search box to learn more about \"Heart-Healthy Diet: Care Instructions. \" Current as of: December 6, 2017 Content Version: 11.7 © 4486-6736 [employer]. Care instructions adapted under license by [employer] (which disclaims liability or warranty for this information). If you have questions about a medical condition or this instruction, always ask your healthcare professional. [place] [address] any warranty or liability for your use of this information. DISCHARGE SUMMARY from Nurse PATIENT INSTRUCTIONS: 
 
After general anesthesia or intravenous sedation, for 24 hours or while taking prescription Narcotics: · Limit your activities · Do not drive and operate hazardous machinery · Do not make important personal or business decisions · Do  not drink alcoholic beverages · If you have not urinated within 8 hours after discharge, please contact your surgeon on call. Report the following to your surgeon: 
· Excessive pain, swelling, redness or odor of or around the surgical area · Temperature over 100.5 · Nausea and vomiting lasting longer than 4 hours or if unable to take medications · Any signs of decreased circulation or nerve impairment to extremity: change in color, persistent  numbness, tingling, coldness or increase pain · Any questions What to do at Home: *  Please give a list of your current medications to your Primary Care Provider. *  Please update this list whenever your medications are discontinued, doses are 
    changed, or new medications (including over-the-counter products) are added. *  Please carry medication information at all times in case of emergency situations. These are general instructions for a healthy lifestyle: No smoking/ No tobacco products/ Avoid exposure to second hand smoke Surgeon General's Warning:  Quitting smoking now greatly reduces serious risk to your health. Obesity, smoking, and sedentary lifestyle greatly increases your risk for illness A healthy diet, regular physical exercise & weight monitoring are important for maintaining a healthy lifestyle You may be retaining fluid if you have a history of heart failure or if you experience any of the following symptoms:  Weight gain of 3 pounds or more overnight or 5 pounds in a week, increased swelling in our hands or feet or shortness of breath while lying flat in bed. Please call your doctor as soon as you notice any of these symptoms; do not wait until your next office visit. Recognize signs and symptoms of STROKE: 
 
F-face looks uneven A-arms unable to move or move unevenly S-speech slurred or non-existent T-time-call 911 as soon as signs and symptoms begin-DO NOT go Back to bed or wait to see if you get better-TIME IS BRAIN. Warning Signs of HEART ATTACK Call 911 if you have these symptoms: 
? Chest discomfort. Most heart attacks involve discomfort in the center of the chest that lasts more than a few minutes, or that goes away and comes back. It can feel like uncomfortable pressure, squeezing, fullness, or pain. ? Discomfort in other areas of the upper body.  Symptoms can include pain or discomfort in one or both arms, the back, neck, jaw, or stomach. ? Shortness of breath with or without chest discomfort. ? Other signs may include breaking out in a cold sweat, nausea, or lightheadedness. Don't wait more than five minutes to call [address]! Fast action can save your life. Calling 911 is almost always the fastest way to get lifesaving treatment. Emergency Medical Services staff can begin treatment when they arrive  up to an hour sooner than if someone gets to the hospital by car. The discharge information has been reviewed with the patient. The patient verbalized understanding. Discharge medications reviewed with the patient and appropriate educational materials and side effects teaching were provided. ___________________________________________________________________________________________________________________________________ Crediihart Announcement We are excited to announce that we are making your provider's discharge notes available to you in Birdi. You will see these notes when they are completed and signed by the physician that discharged you from your recent hospital stay. If you have any questions or concerns about any information you see in Traxert, please call the Health Information Department where you were seen or reach out to your Primary Care Provider for more information about your plan of care. Introducing [facility]! Freida Meng introduces Birdi patient portal. Now you can access parts of your medical record, email your doctor's office, and request medication refills online. 1. In your internet browser, go to [email or web address] 2. Click on the First Time User? Click Here link in the Sign In box. You will see the New Member Sign Up page. 3. Enter your Birdi Access Code exactly as it appears below. You will not need to use this code after youve completed the sign-up process.  If you do not sign up before the expiration date, you must request a new code. · [employer] Access Code: [email or web address] Expires: 12/5/2018 10:33 AM 
 
4. Enter the last four digits of your Social Security Number (xxxx) and Date of Birth (mm/dd/yyyy) as indicated and click Submit. You will be taken to the next sign-up page. 5. Create a Infinite Power Solutionst ID. This will be your [employer] login ID and cannot be changed, so think of one that is secure and easy to remember. 6. Create a [employer] password. You can change your password at any time. 7. Enter your Password Reset Question and Answer. This can be used at a later time if you forget your password. 8. Enter your e-mail address. You will receive e-mail notification when new information is available in [address] 9. Click Sign Up. You can now view and download portions of your medical record. 10. Click the Download Summary menu link to download a portable copy of your medical information. If you have questions, please visit the Frequently Asked Questions section of the [employer] website. Remember, [employer] is NOT to be used for urgent needs. For medical emergencies, dial 911. Now available from your iPhone and Android! Introducing Jh Diaz As a [facility] patient, I wanted to make you aware of our electronic visit tool called Jh Diaz. [facility] 24/7 allows you to connect within minutes with a medical provider 24 hours a day, seven days a week via a mobile device or tablet or logging into a secure website from your computer. You can access Jh Diaz from anywhere in the [place].  
 
A virtual visit might be right for you when you have a simple condition and feel like you just dont want to get out of bed, or cant get away from work for an appointment, when your regular [facility] provider is not available (evenings, weekends or holidays), or when youre out of town and need minor care. Electronic visits cost only $49 and if the [employer] 24/7 provider determines a prescription is needed to treat your condition, one can be electronically transmitted to a nearby pharmacy*. Please take a moment to enroll today if you have not already done so. The enrollment process is free and takes just a few minutes. To enroll, please download the [employer] 24/7 dali to your tablet or phone, or visit www."[employer]". org to enroll on your computer. And, as an [address] [place] [address] patient with a [employer] account, the results of your visits will be scanned into your electronic medical record and your primary care provider will be able to view the scanned results. We urge you to continue to see your regular [employer] provider for your ongoing medical care. And while your primary care provider may not be the one available when you seek a [email or web address] virtual visit, the peace of mind you get from getting a real diagnosis real time can be priceless. For more information on [email or web address], view our Frequently Asked Questions (FAQs) at www."[employer]". [email or web address]. Sincerely, 
 
Priya Potter MD 
Chief Medical Officer [address] *:  certain medications cannot be prescribed via [email or web address] Unresulted Labs-Please follow up with your PCP about these lab tests Order Current Status EKG, 12 LEAD, INITIAL Preliminary result Providers Seen During Your Hospitalization Provider Specialty Primary office phone Ирина Snow MD Cardiology [phone] Your Primary Care Physician (PCP) Primary Care Physician Office Phone Office Fax [address] [phone] You are allergic to the following No active allergies Recent Documentation Height Weight Breastfeeding? BMI OB Status Smoking Status 1.676 m 61.3 kg No 21.81 kg/m2 Postmenopausal Never Smoker Emergency Contacts Name Discharge Info Relation Home Work Mobile [facility] DISCHARGE CAREGIVER [3] Son [22] [phone] Momo Browning CAREGIVER [3] Friend [5]   [phone] Yasir Hanna DISCHARGE CAREGIVER [3] Brother [24] [phone] Patient Belongings The following personal items are in your possession at time of discharge: 
  Dental Appliances: None  Visual Aid: Glasses, With patient      Home Medications: Kept at bedside   Jewelry: None  Clothing: At bedside    Other Valuables: Cell Phone Discharge Instructions Attachments/References CARDIAC REHABILITATION (ENGLISH) Patient Handouts Cardiac Rehabilitation: Care Instructions Your Care Instructions Cardiac rehabilitation is a program for people who have a heart problem, such as a heart attack, heart failure, or a heart valve disease. The program includes exercise, lifestyle changes, education, and emotional support. Cardiac rehab can help you improve the quality of your life through better overall health. It can help you lose weight and feel better about yourself. On your cardiac rehab team, you may have your doctor, a nurse specialist, a dietitian, and a physical therapist. They will design your cardiac rehab program specifically for you. You will learn how to reduce your risk for heart problems, how to manage stress, and how to eat a heart-healthy diet. By the end of the program, you will be ready to maintain a healthier lifestyle on your own. Follow-up care is a key part of your treatment and safety. Be sure to make and go to all appointments, and call your doctor if you are having problems. It's also a good idea to know your test results and keep a list of the medicines you take. How can you care for yourself at home? · Take your medicines exactly as prescribed.  Call your doctor if you think you are having a problem with your medicine. You will get more details on the specific medicines your doctor prescribes. · Weigh yourself every day if your doctor tells you to. Watch for sudden weight gain. Weigh yourself on the same scale with the same amount of clothing at the same time of day. · Plan your meals so that you are eating heart-healthy foods. ¨ Eat a variety of foods daily. Fresh fruits and vegetables and whole-grains are good choices. ¨ Limit your fat intake, especially saturated and trans fat. ¨ Limit salt (sodium). ¨ Increase fiber in your diet. ¨ Limit alcohol. · Learn how to take your pulse so that you can track your heart rate during exercise. · Always check with your doctor before you begin a new exercise program. 
· Warm up before you exercise and cool down afterward for at least 15 minutes each. This will help your heart gradually prepare for and recover from exercise and avoid pushing your heart too hard. · Stop exercising if you have any unusual discomfort, such as chest pain. · Do not smoke. Smoking can make heart problems worse. If you need help quitting, talk to your doctor about stop-smoking programs and medicines. These can increase your chances of quitting for good. When should you call for help? Call 911 anytime you think you may need emergency care. For example, call if: 
  · You have severe trouble breathing.  
  · You cough up pink, foamy mucus and you have trouble breathing.  
  · You have symptoms of a heart attack. These may include: ¨ Chest pain or pressure, or a strange feeling in the chest. 
¨ Sweating. ¨ Shortness of breath. ¨ Nausea or vomiting. ¨ Pain, pressure, or a strange feeling in the back, neck, jaw, or upper belly or in one or both shoulders or arms. ¨ Lightheadedness or sudden weakness. ¨ A fast or irregular heartbeat.  
After you call 911, the  may tell you to chew 1 adult-strength or 2 to 4 low-dose aspirin. Wait for an ambulance. Do not try to drive yourself.  
  · You have angina symptoms (such as chest pain or pressure) that do not go away with rest or are not getting better within 5 minutes after you take a dose of nitroglycerin.  
  · You have symptoms of a stroke. These may include: 
¨ Sudden numbness, tingling, weakness, or loss of movement in your face, arm, or leg, especially on only one side of your body. ¨ Sudden vision changes. ¨ Sudden trouble speaking. ¨ Sudden confusion or trouble understanding simple statements. ¨ Sudden problems with walking or balance. ¨ A sudden, severe headache that is different from past headaches.  
  · You passed out (lost consciousness).  
 Call your doctor now or seek immediate medical care if: 
  · You have new or increased shortness of breath.  
  · You are dizzy or lightheaded, or you feel like you may faint.  
  · You gain weight suddenly, such as more than 2 to 3 pounds in a day or 5 pounds in a week. (Your doctor may suggest a different range of weight gain.)  
  · You have increased swelling in your legs, ankles, or feet.  
 Watch closely for changes in your health, and be sure to contact your doctor if you have any problems. Where can you learn more? Go to http://sheri-reynaldo.info/. Enter S658 in the search box to learn more about \"Cardiac Rehabilitation: Care Instructions. \" Current as of: December 6, 2017 Content Version: 11.7 © 2892-8582 [employer]. Care instructions adapted under license by [employer] (which disclaims liability or warranty for this information). If you have questions about a medical condition or this instruction, always ask your healthcare professional. [place] [address] any warranty or liability for your use of this information. Please provide this summary of care documentation to your next provider. Signatures-by signing, you are acknowledging that this After Visit Summary has been reviewed with you and you have received a copy. Patient Signature:  ____________________________________________________________ Date:  ____________________________________________________________  
  
[facility]la Alliance Provider Signature:  ____________________________________________________________ Date:  ____________________________________________________________

## 2018-09-11 NOTE — PROGRESS NOTES
Patient received to room 326 and connected to telemetry monitor. Assessment completed and plan of care reviewed. right femoral arterial sheath intact; small ooze noted from site, unchanged from prep and recovery report. connected to art line and not displaying acceptable waveform. Cath lab staff called to assess sheath site and tubing. Site dry and intact without hematoma. BP cycling every 15 minutes. Patient oriented to room and call light. Patient voiced understanding to lay flat with affected leg straight. Patient aware of NPO status. Patient voiced understanding to use call light to communicate needs. Pt voided 400cc in bed pan. Admission skin assessment completed with second RN. Sacrum and back not assessed as sheath in place. No other abnormalities.

## 2018-09-11 NOTE — PROGRESS NOTES
Verbal bedside report given to Collene Courser, oncoming RN. Patient's situation, background, assessment and recommendations provided. Kardex, Mar, and recent results also reviewed. Opportunity for questions provided. Oncoming RN assumed care of patient.

## 2018-09-11 NOTE — PROGRESS NOTES
Report received from Gina Mesa. Procedural findings communicated. Intra procedural  medication administration reviewed. Progression of care discussed. Patient received into [address] [facility] 7 with 6fr sheath to RFA Access site without bleeding or swelling yes Dressing dry and intact yes Patient instructed to limit movement to right lower extremity Routine post procedural vital signs and site assessment initiated yes

## 2018-09-11 NOTE — PROCEDURES
[address] [facility]    Negro Cordero  MR#: [id number]  : 1955  ACCOUNT #: [de-identified]   DATE OF SERVICE: 2018    REFERRING:  Valerie John MD    PRIMARY CARE PHYSICIAN:  Kelly Estrella MD    REASON FOR PROCEDURE:  Recurrent tachycardia with sudden onset shortness of breath and mild chest pressure with equivocal troponin at 0.07. The patient has inferolateral T-wave inversions and presents for definitive catheterization with a high suspicion for underlying coronary disease. PROCEDURE PERFORMED:  Left heart catheterization with coronary angiography and left ventriculogram, pressure wire to the LAD and ramus intermedius with eventual PTCA and stenting to the ramus intermedius branch of the circumflex. TOTAL CONTRAST:  220 mL of Isovue. CONSCIOUS SEDATION:  The patient was sedated by Yecenia Siemens with 2 mg Versed, 75 mcg fentanyl and monitored without complication from 41:67 to 14:34. Frailty score is a 3. DESCRIPTION OF PROCEDURE:  After informed consent was obtained, the patient was brought to the cath lab, prepped and draped in the usual fashion. A 6-Nauruan sheath was placed in the right femoral artery via the modified Seldinger technique. Given the absence of adequate radial pulse. Left heart catheterization was performed using standard 6-Nauruan catheters. Percutaneous intervention was performed through a 6-Nauruan CLS 3.5 guiding catheter utilizing Angiomax for anticoagulation with a therapeutic periprocedural ACT. The patient was loaded with 180 mg of Brilinta at the conclusion of the procedure. Manual pressure will be applied to the patient's access site per protocol. PRESSURE RESULTS:  Aorta 103/50, left ventricle 103/10-12. Left ventriculogram reveals a normal-sized ventricle with global hypokinesis and ejection fraction estimated to be about 25-30%. There is mild to moderate mitral regurgitation.   There is no aortic valve gradient on catheter pullback. Left ventricular end diastolic pressure is high normal.    CORONARY ANATOMY:  The left main has minimal irregularities dividing into an LAD and circumflex in the usual fashion. The LAD wraps around the apex of the left ventricle and supplies a long, but small caliber mid vessel diagonal branch. The LAD has calcification proximally with mild irregularities up to 10-20%. In the mid vessel at the takeoff of the diagonal branch, there is a smooth 40-50% narrowing. The distal and apical LAD and the entire diagonal branch have minimal irregularities. The circumflex gives off a high obtuse marginal branch which functions as a ramus intermedius. This is a large branch which travels down the anterolateral wall with a long 50-60% proximal to mid vessel lesion. The distal vessel has minimal luminal irregularities in multiple branches. The continuing circumflex terminates in a small obtuse marginal branch which is normal.    The right coronary has mild proximal calcification. There is diffuse disease up to 20% throughout the proximal to mid RCA. The distal vessel has minimal irregularity. The posterior descending and posterolateral branches are somewhat small with mild luminal irregularity. PERCUTANEOUS INTERVENTION REPORT:  After systemic anticoagulation with Angiomax and verification of therapeutic ACT, a Hope  wire was advanced into the distal LAD. The iFR was 0.97 on 2 occasions indicating hemodynamic insignificance of this focal moderate severity mid LAD lesion, which will be left to medical therapy. The wire was then advanced down the ramus intermedius branch with 2 iFRs measuring 0.87. We used the pressure wire for an angioplasty wire and predilated the proximal and mid portion of the ramus intermedius branch with a 2.5 mm compliant balloon to 16 atmospheres.   We then stented the entire segment of disease extending from the ostium of the ramus through the mid portion deploying a 3.0 x 30 mm [place] drug-eluting stent to 16 atmospheres and post-dilating with a 3 mm noncompliant balloon up to 20 atmospheres in the mid part of the stent, 16 atmospheres proximally and 12 atmospheres distalmost.  There was excellent angiographic result with less than 10% residual stenosis, no dissection and JEANNE 3 flow. The patient tolerated the procedure well. CONCLUSION:  1. Coronary artery disease, status post pressure wire directed PTCA and stenting to a ramus intermedius branch with negative pressure wire interrogation of the mid LAD. 2.  Mixed ischemic/nonischemic cardiomyopathy with mild to moderate mitral regurgitation. Zahida Lund MD       ATS / LN  D: 09/11/2018 14:52     T: 09/11/2018 15:49  JOB #: [id number]  CC: GOOD MENDEZ MD  CC: Kaleb Ely DO  [facility] CARDIOLOGY  [address] [place] [address] [address] [place] [address], [address] [place] [address]  CC: Marilyn Willis MD  [facility]  [facility]

## 2018-09-11 NOTE — IP AVS SNAPSHOT
[address] 
[phone] Patient: Cirilo Briones MRN: [id number] :1955 A check yassine indicates which time of day the medication should be taken. My Medications START taking these medications Instructions Each Dose to Equal  
 Morning Noon Evening Bedtime  
 carvedilol 3.125 mg tablet Commonly known as:  Cleatis Neeraj Take 1 Tab by mouth two (2) times daily (with meals). 3.125 mg  
    
  
   
   
  
   
  
 lisinopril 5 mg tablet Commonly known as:  Verna Escort Take 1 Tab by mouth two (2) times a day. 5 mg  
    
  
   
   
  
   
  
 nitroglycerin 0.4 mg SL tablet Commonly known as:  NITROSTAT  
   
 1 Tab by SubLINGual route every five (5) minutes as needed for Chest Pain. Up to 3 doses. 0.4 mg  
    
   
   
   
  
 ticagrelor 90 mg tablet Commonly known as:  [employer] Take 1 Tab by mouth every twelve (12) hours every twelve (12) hours. 90 mg CONTINUE taking these medications Instructions Each Dose to Equal  
 Morning Noon Evening Bedtime  
 aspirin delayed-release 81 mg tablet Take 81 mg by mouth two (2) times a day. 81 mg  
    
  
   
   
  
   
  
 GLUCOPHAGE 1,000 mg tablet Generic drug:  metFORMIN Take 1,000 mg by mouth two (2) times daily (with meals). 1000 mg  
    
  
   
   
  
   
  
 pravastatin 40 mg tablet Commonly known as:  PRAVACHOL Take 40 mg by mouth nightly. 40 mg PRENATAL MULTI PO Take  by mouth daily. STOOL SOFTENER 100 mg capsule Generic drug:  docusate sodium Take 100 mg by mouth two (2) times a day. 100 mg  
    
  
   
   
  
   
  
 traMADol 50 mg tablet Commonly known as:  ULTRAM  
   
 Take 50 mg by mouth two (2) times a day.   
 50 mg  
    
  
   
   
  
   
  
  
 STOP taking these medications   
 hydroCHLOROthiazide 25 mg tablet Commonly known as:  HYDRODIURIL  
   
  
 metoprolol tartrate 25 mg tablet Commonly known as:  LOPRESSOR Where to Get Your Medications Information on where to get these meds will be given to you by the nurse or doctor. ! Ask your nurse or doctor about these medications  
  carvedilol 3.125 mg tablet  
 lisinopril 5 mg tablet  
 nitroglycerin 0.4 mg SL tablet  
 ticagrelor 90 mg tablet

## 2018-09-11 NOTE — PROGRESS NOTES
Pt admitted at Observation status. Pt instructed on home medication policy; pt voices understanding. Pt provided copies of the following: Admission pamphlet with Observation insert and Medicare FAQ's. Home meds ordered per MD, verified with [facility] and supplied by patient. No narcotic meds. Medications verified and labeled per pharmacy and placed in locked box in patient's room. The medications received from the patient include ASA, pravastatin. All other meds including, vitamins, toprol, metformin, hctz, and tramadol were sent home with pt's son.

## 2018-09-11 NOTE — PROGRESS NOTES
TRANSFER - IN REPORT: 
 
Verbal report received from [place] on Alray Ebbing being received from [facility] for routine progression of care Report consisted of patients Situation, Background, Assessment and Recommendations(SBAR). Information from the following report(s) SBAR, Kardex and Procedure Summary was reviewed with the receiving nurse. Opportunity for questions and clarification was provided. Assessment completed upon patients arrival to unit and care assumed.

## 2018-09-12 VITALS
HEIGHT: 66 IN | WEIGHT: 135.1 LBS | HEART RATE: 81 BPM | RESPIRATION RATE: 18 BRPM | OXYGEN SATURATION: 97 % | SYSTOLIC BLOOD PRESSURE: 110 MMHG | DIASTOLIC BLOOD PRESSURE: 64 MMHG | TEMPERATURE: 97.6 F | BODY MASS INDEX: 21.71 KG/M2

## 2018-09-12 LAB
ANION GAP SERPL CALC-SCNC: 10 MMOL/L (ref 7–16)
ATRIAL RATE: 76 BPM
BASOPHILS # BLD: 0 K/UL (ref 0–0.2)
BASOPHILS NFR BLD: 0 % (ref 0–2)
BUN SERPL-MCNC: 18 MG/DL (ref 8–23)
CALCIUM SERPL-MCNC: 9.2 MG/DL (ref 8.3–10.4)
CALCULATED P AXIS, ECG09: 65 DEGREES
CALCULATED R AXIS, ECG10: 63 DEGREES
CALCULATED T AXIS, ECG11: -143 DEGREES
CHLORIDE SERPL-SCNC: 106 MMOL/L (ref 98–107)
CHOLEST SERPL-MCNC: 125 MG/DL
CO2 SERPL-SCNC: 22 MMOL/L (ref 21–32)
CREAT SERPL-MCNC: 0.63 MG/DL (ref 0.6–1)
DIAGNOSIS, 93000: NORMAL
DIFFERENTIAL METHOD BLD: ABNORMAL
EOSINOPHIL # BLD: 0.1 K/UL (ref 0–0.8)
EOSINOPHIL NFR BLD: 1 % (ref 0.5–7.8)
ERYTHROCYTE [DISTWIDTH] IN BLOOD BY AUTOMATED COUNT: 14.5 %
GLUCOSE BLD STRIP.AUTO-MCNC: 132 MG/DL (ref 65–100)
GLUCOSE BLD STRIP.AUTO-MCNC: 135 MG/DL (ref 65–100)
GLUCOSE SERPL-MCNC: 124 MG/DL (ref 65–100)
HCT VFR BLD AUTO: 34.1 % (ref 35.8–46.3)
HDLC SERPL-MCNC: 47 MG/DL (ref 40–60)
HDLC SERPL: 2.7 {RATIO}
HGB BLD-MCNC: 11.2 G/DL (ref 11.7–15.4)
IMM GRANULOCYTES # BLD: 0 K/UL (ref 0–0.5)
IMM GRANULOCYTES NFR BLD AUTO: 0 % (ref 0–5)
LDLC SERPL CALC-MCNC: 62.6 MG/DL
LIPID PROFILE,FLP: NORMAL
LYMPHOCYTES # BLD: 1.7 K/UL (ref 0.5–4.6)
LYMPHOCYTES NFR BLD: 24 % (ref 13–44)
MCH RBC QN AUTO: 27.3 PG (ref 26.1–32.9)
MCHC RBC AUTO-ENTMCNC: 32.8 G/DL (ref 31.4–35)
MCV RBC AUTO: 83.2 FL (ref 79.6–97.8)
MONOCYTES # BLD: 0.5 K/UL (ref 0.1–1.3)
MONOCYTES NFR BLD: 6 % (ref 4–12)
NEUTS SEG # BLD: 4.9 K/UL (ref 1.7–8.2)
NEUTS SEG NFR BLD: 68 % (ref 43–78)
NRBC # BLD: 0 K/UL (ref 0–0.2)
P-R INTERVAL, ECG05: 170 MS
PLATELET # BLD AUTO: 327 K/UL (ref 150–450)
PMV BLD AUTO: 9.5 FL (ref 9.4–12.3)
POTASSIUM SERPL-SCNC: 3.6 MMOL/L (ref 3.5–5.1)
Q-T INTERVAL, ECG07: 438 MS
QRS DURATION, ECG06: 94 MS
QTC CALCULATION (BEZET), ECG08: 492 MS
RBC # BLD AUTO: 4.1 M/UL (ref 4.05–5.2)
SODIUM SERPL-SCNC: 138 MMOL/L (ref 136–145)
TRIGL SERPL-MCNC: 77 MG/DL (ref 35–150)
VENTRICULAR RATE, ECG03: 76 BPM
VLDLC SERPL CALC-MCNC: 15.4 MG/DL (ref 6–23)
WBC # BLD AUTO: 7.2 K/UL (ref 4.3–11.1)

## 2018-09-12 PROCEDURE — 80048 BASIC METABOLIC PNL TOTAL CA: CPT

## 2018-09-12 PROCEDURE — 74011250636 HC RX REV CODE- 250/636: Performed by: INTERNAL MEDICINE

## 2018-09-12 PROCEDURE — 82962 GLUCOSE BLOOD TEST: CPT

## 2018-09-12 PROCEDURE — C8929 TTE W OR WO FOL WCON,DOPPLER: HCPCS

## 2018-09-12 PROCEDURE — 80061 LIPID PANEL: CPT

## 2018-09-12 PROCEDURE — 93005 ELECTROCARDIOGRAM TRACING: CPT | Performed by: INTERNAL MEDICINE

## 2018-09-12 PROCEDURE — 74011000250 HC RX REV CODE- 250: Performed by: INTERNAL MEDICINE

## 2018-09-12 PROCEDURE — 36415 COLL VENOUS BLD VENIPUNCTURE: CPT

## 2018-09-12 PROCEDURE — 74011250637 HC RX REV CODE- 250/637: Performed by: INTERNAL MEDICINE

## 2018-09-12 PROCEDURE — 85025 COMPLETE CBC W/AUTO DIFF WBC: CPT

## 2018-09-12 PROCEDURE — 99218 HC RM OBSERVATION: CPT

## 2018-09-12 RX ORDER — POTASSIUM CHLORIDE 20 MEQ/1
40 TABLET, EXTENDED RELEASE ORAL
Status: COMPLETED | OUTPATIENT
Start: 2018-09-12 | End: 2018-09-12

## 2018-09-12 RX ORDER — PRAVASTATIN SODIUM 20 MG/1
40 TABLET ORAL
Status: DISCONTINUED | OUTPATIENT
Start: 2018-09-12 | End: 2018-09-12 | Stop reason: HOSPADM

## 2018-09-12 RX ORDER — CARVEDILOL 3.12 MG/1
3.12 TABLET ORAL 2 TIMES DAILY WITH MEALS
Qty: 60 TAB | Refills: 11 | Status: SHIPPED | OUTPATIENT
Start: 2018-09-12 | End: 2018-09-18 | Stop reason: SDUPTHER

## 2018-09-12 RX ORDER — LISINOPRIL 5 MG/1
5 TABLET ORAL 2 TIMES DAILY
Qty: 60 TAB | Refills: 11 | Status: SHIPPED | OUTPATIENT
Start: 2018-09-12 | End: 2018-09-18 | Stop reason: ALTCHOICE

## 2018-09-12 RX ORDER — NITROGLYCERIN 0.4 MG/1
0.4 TABLET SUBLINGUAL
Qty: 1 BOTTLE | Refills: 5 | Status: SHIPPED | OUTPATIENT
Start: 2018-09-12

## 2018-09-12 RX ADMIN — PERFLUTREN 1 ML: 6.52 INJECTION, SUSPENSION INTRAVENOUS at 07:00

## 2018-09-12 RX ADMIN — ASPIRIN 81 MG 81 MG: 81 TABLET ORAL at 09:06

## 2018-09-12 RX ADMIN — ACETAMINOPHEN 650 MG: 325 TABLET ORAL at 08:59

## 2018-09-12 RX ADMIN — Medication 10 ML: at 05:48

## 2018-09-12 RX ADMIN — POTASSIUM CHLORIDE 40 MEQ: 20 TABLET, EXTENDED RELEASE ORAL at 08:59

## 2018-09-12 RX ADMIN — TICAGRELOR 90 MG: 90 TABLET ORAL at 05:48

## 2018-09-12 RX ADMIN — LISINOPRIL 5 MG: 5 TABLET ORAL at 08:59

## 2018-09-12 RX ADMIN — ACETAMINOPHEN 650 MG: 325 TABLET ORAL at 13:32

## 2018-09-12 RX ADMIN — CARVEDILOL 3.12 MG: 3.12 TABLET, FILM COATED ORAL at 08:59

## 2018-09-12 NOTE — PROGRESS NOTES
[facility] PROGRESS NOTE 
 
9/12/2018 7:32 AM 
 
Admit Date: 9/11/2018 Admit Diagnosis: Dyspnea [R06.00] Subjective:  
Stable overnight without angina, CHF, or palpitations. Vitals stable and controlled. No other complaints overnight. Tolerating meds well. Objective:  
  
Vitals:  
 09/11/18 1700 09/11/18 2044 09/12/18 0050 09/12/18 2063 BP: 141/82 125/59 105/62 100/54 Pulse: 88 85 70 78 Resp: 16 16 16 16 Temp: 98.6 °F (37 °C) 98.4 °F (36.9 °C) 98.1 °F (36.7 °C) 98.1 °F (36.7 °C) SpO2: 98% 96% 98% 99% Weight:    61.3 kg (135 lb 1.6 oz) Height:      
 
 
Physical Exam: 
Neck- supple, no JVD 
CV- regular rate and rhythm no MRG Lung- clear bilaterally Abd- soft, nontender, nondistended Ext- no edema, right groin CDI Skin- warm and dry Data Review:  
Recent Labs  
   09/12/18 
 0340 NA  138  
K  3.6 BUN  18 CREA  0.63 GLU  124* WBC  7.2 HGB  11.2* HCT  34.1*  
PLT  327 Assessment and Plan: Active Problems: 
  Chronic systolic CHF (congestive heart failure) ([address] (9/11/2018)- mixed ischemic/non-ischemic- s/p PCI ramus yesterday but with global HK- check echo. If EF <35%, give life vest prior to discharge given low EF and acute tachycardia. CAD- s/p PCI ramus- The importance of compliance with dual antiplatelet therapy was emphasized. The risk of non-compliance, including stent thrombosis, acute MI, acute LV systolic dysfunction, and death was discussed and understood. Dyslipidemia- continue statin, check lipids in AM- titrate as needed Home today after echo and life vest fitting if needed. Home on new CHF meds, ASA/Brilinta, and follow up with Dr. Jorge Wright. Monica Peck MD 
[facility] Cardiology Pager [phone]

## 2018-09-12 NOTE — PROGRESS NOTES
Discharge instructions reviewed with patient. Prescriptions given for all medications and med info sheets provided for all new medications. Opportunity for questions provided. Patient voiced understanding of all discharge instructions.

## 2018-09-12 NOTE — PROGRESS NOTES
Bedside report received from Teresa Fisher, [facility]. Care assumed.

## 2018-09-12 NOTE — PROGRESS NOTES
Verbal bedside report given to Carlos Lundberg oncoming RN. Patient's situation, background, assessment and recommendations provided. Opportunity for questions provided. Oncoming RN assumed care of patient. R groin site visualized.

## 2018-09-12 NOTE — DISCHARGE INSTRUCTIONS
Resume metformin on 9/14/18     DISPOSITION: The patient is being discharged home in stable condition on a low saturated fat, low cholesterol and low salt diet. The patient is instructed to advance activities as tolerated to the limit of fatigue or shortness of breath. The patient is instructed to avoid all heavy lifting, straining, stooping or squatting for 3-5 days. The patient is instructed to watch the cath site for bleeding/oozing; if seen, the patient is instructed to apply firm pressure with a clean cloth and call [facility] at [phone]. The patient is instructed to watch for signs of infection which include: increasing area of redness, fever/hot to touch or purulent drainage at the catheterization site. The patient is instructed not to soak in a bathtub for 7-10 days, but is cleared to shower. The patient is instructed to call the office or return to the ER for immediate evaluation for any shortness of breath or chest pain not relieved by NTG. Percutaneous Coronary Intervention: What to Expect at [facility]    Percutaneous coronary intervention (PCI) is the name for procedures that are used to open a narrowed or blocked coronary artery. The two most common PCI procedures are coronary angioplasty and coronary stent placement. Your groin or arm may have a bruise and feel sore for a day or two after a percutaneous coronary intervention (PCI). You can do light activities around the house, but nothing strenuous for several days. This care sheet gives you a general idea about how long it will take for you to recover. But each person recovers at a different pace. Follow the steps below to get better as quickly as possible. How can you care for yourself at home? Activity    · If the doctor gave you a sedative:  ¨ For 24 hours, don't do anything that requires attention to detail. It takes time for the medicine's effects to completely wear off.   ¨ For your safety, do not drive or operate any machinery that could be dangerous. Wait until the medicine wears off and you can think clearly and react easily.     · Do not do strenuous exercise and do not lift, pull, or push anything heavy until your doctor says it is okay. This may be for a day or two. You can walk around the house and do light activity, such as cooking.     · If the catheter was placed in your groin, try not to walk up stairs for the first couple of days.     · If the catheter was placed in your arm near your wrist, do not bend your wrist deeply for the first couple of days. Be careful using your hand to get into and out of a chair or bed.     · Carry your stent identification card with you at all times.     · If your doctor recommends it, get more exercise. Walking is a good choice. Bit by bit, increase the amount you walk every day. Try for at least 30 minutes on most days of the week. Diet    · Drink plenty of fluids to help your body flush out the dye. If you have kidney, heart, or liver disease and have to limit fluids, talk with your doctor before you increase the amount of fluids you drink.     · Keep eating a heart-healthy diet that has lots of fruits, vegetables, and whole grains. If you have not been eating this way, talk to your doctor. You also may want to talk to a dietitian. This expert can help you to learn about healthy foods and plan meals. Medicines    · Your doctor will tell you if and when you can restart your medicines. He or she will also give you instructions about taking any new medicines.     · If you take blood thinners, such as warfarin (Coumadin), clopidogrel (Plavix), or aspirin, be sure to talk to your doctor. He or she will tell you if and when to start taking those medicines again. Make sure that you understand exactly what your doctor wants you to do.     · Your doctor will prescribe blood-thinning medicines. You will likely take aspirin plus another antiplatelet, such as clopidogrel (Plavix).  It is very important that you take these medicines exactly as directed. These medicines help keep the coronary artery open and reduce your risk of a heart attack.     · Call your doctor if you think you are having a problem with your medicine.    Care of the catheter site    · For 1 or 2 days, keep a bandage over the spot where the catheter was inserted. The bandage probably will fall off in this time.     · Put ice or a cold pack on the area for 10 to 20 minutes at a time to help with soreness or swelling. Put a thin cloth between the ice and your skin.     · You may shower 24 to 48 hours after the procedure, if your doctor okays it. Pat the incision dry.     · Do not soak the catheter site until it is healed. Don't take a bath for 1 week, or until your doctor tells you it is okay.     · If you are bleeding, lie down and press on the area for 15 minutes to try to make it stop. If the bleeding does not stop, call your doctor or seek immediate medical care. Follow-up care is a key part of your treatment and safety. Be sure to make and go to all appointments, and call your doctor if you are having problems. It's also a good idea to know your test results and keep a list of the medicines you take. When should you call for help? Call 911 anytime you think you may need emergency care. For example, call if:    · You passed out (lost consciousness).     · You have severe trouble breathing.     · You have sudden chest pain and shortness of breath, or you cough up blood.     · You have symptoms of a heart attack, such as:  ¨ Chest pain or pressure. ¨ Sweating. ¨ Shortness of breath. ¨ Nausea or vomiting. ¨ Pain that spreads from the chest to the neck, jaw, or one or both shoulders or arms. ¨ Dizziness or lightheadedness. ¨ A fast or uneven pulse. After calling 911, chew 1 adult-strength aspirin. Wait for an ambulance.  Do not try to drive yourself.     · You have been diagnosed with angina, and you have angina symptoms that do not go away with rest or are not getting better within 5 minutes after you take one dose of nitroglycerin.    Call your doctor now or seek immediate medical care if:    · You are bleeding from the area where the catheter was put in your artery.     · You have a fast-growing, painful lump at the catheter site.     · You have signs of infection, such as:  ¨ Increased pain, swelling, warmth, or redness. ¨ Red streaks leading from the catheter site. ¨ Pus draining from the catheter site. ¨ A fever.     · Your leg or arm looks blue or feels cold, numb, or tingly.    Watch closely for changes in your health, and be sure to contact your doctor if you have any problems. Where can you learn more? Go to http://sheri-reynaldo.info/. Enter R268 in the search box to learn more about \"Percutaneous Coronary Intervention: What to Expect at Home. \"  Current as of: December 6, 2017  Content Version: 11.7  © 8519-7967 [employer]. Care instructions adapted under license by [employer] (which disclaims liability or warranty for this information). If you have questions about a medical condition or this instruction, always ask your healthcare professional. [place] [address] any warranty or liability for your use of this information. Cardiac Catheterization/Angiography Discharge Instructions    *Check the puncture site frequently for swelling or bleeding. If you see any bleeding, lie down and apply pressure over the area with a clean town or washcloth. Notify your doctor for any redness, swelling, drainage or oozing from the puncture site. Notify your doctor for any fever or chills. *If the leg or arm with the puncture becomes cold, numb or painful, call [address] Cardiology at  [phone]. *Activity should be limited for the next 48 hours. Climb stairs as little as possible and avoid any stooping, bending or strenuous activity for 48 hours.  No heavy lifting (anything over 10 pounds) for three days. *Do not drive for 48 hours. *You may resume your usual diet. Drink more fluids than usual.    *Have a responsible person drive you home and stay with you for at least 24 hours after your heart catheterization/angiography. *You may remove the bandage from your Right Arm in 24 hours. You may shower in 24 hours. No tub baths, hot tubs or swimming for one week. Do not place any lotions, creams, powders, ointments over the puncture site for one week. You may place a clean band-aid over the puncture site each day for 5 days. Change this daily. Heart Failure: Care Instructions  Your Care Instructions    Heart failure occurs when your heart does not pump as much blood as the body needs. Failure does not mean that the heart has stopped pumping but rather that it is not pumping as well as it should. Over time, this causes fluid buildup in your lungs and other parts of your body. Fluid buildup can cause shortness of breath, fatigue, swollen ankles, and other problems. By taking medicines regularly, reducing sodium (salt) in your diet, checking your weight every day, and making lifestyle changes, you can feel better and live longer. Follow-up care is a key part of your treatment and safety. Be sure to make and go to all appointments, and call your doctor if you are having problems. It's also a good idea to know your test results and keep a list of the medicines you take. How can you care for yourself at home? Medicines    · Be safe with medicines. Take your medicines exactly as prescribed.  Call your doctor if you think you are having a problem with your medicine.     · Do not take any vitamins, over-the-counter medicine, or herbal products without talking to your doctor first. [place] not take ibuprofen (Advil or Motrin) and naproxen (Aleve) without talking to your doctor first. They could make your heart failure worse.     · You may be taking some of the following medicine. ¨ Beta-blockers can slow heart rate, decrease blood pressure, and improve your condition. Taking a beta-blocker may lower your chance of needing to be hospitalized. ¨ Angiotensin-converting enzyme inhibitors (ACEIs) reduce the heart's workload, lower blood pressure, and reduce swelling. Taking an ACEI may lower your chance of needing to be hospitalized again. ¨ Angiotensin II receptor blockers (ARBs) work like ACEIs. Your doctor may prescribe them instead of ACEIs. ¨ Diuretics, also called water pills, reduce swelling. ¨ Potassium supplements replace this important mineral, which is sometimes lost with diuretics. ¨ Aspirin and other blood thinners prevent blood clots, which can cause a stroke or heart attack.    You will get more details on the specific medicines your doctor prescribes. Diet    · Your doctor may suggest that you limit sodium to 2,000 milligrams (mg) a day or less. That is less than 1 teaspoon of salt a day, including all the salt you eat in cooking or in packaged foods. People get most of their sodium from processed foods. Fast food and restaurant meals also tend to be very high in sodium.     · Ask your doctor how much liquid you can drink each day. You may have to limit liquids.    Weight    · Weigh yourself without clothing at the same time each day. Record your weight. Call your doctor if you have a sudden weight gain, such as more than 2 to 3 pounds in a day or 5 pounds in a week. (Your doctor may suggest a different range of weight gain.) A sudden weight gain may mean that your heart failure is getting worse.    Activity level    · Start light exercise (if your doctor says it is okay). Even if you can only do a small amount, exercise will help you get stronger, have more energy, and manage your weight and your stress. Walking is an easy way to get exercise. Start out by walking a little more than you did before.  Bit by bit, increase the amount you walk.     · When you exercise, watch for signs that your heart is working too hard. You are pushing yourself too hard if you cannot talk while you are exercising. If you become short of breath or dizzy or have chest pain, stop, sit down, and rest.     · If you feel \"wiped out\" the day after you exercise, walk slower or for a shorter distance until you can work up to a better pace.     · Get enough rest at night. Sleeping with 1 or 2 pillows under your upper body and head may help you breathe easier.    Lifestyle changes    · Do not smoke. Smoking can make a heart condition worse. If you need help quitting, talk to your doctor about stop-smoking programs and medicines. These can increase your chances of quitting for good. Quitting smoking may be the most important step you can take to protect your heart.     · Limit alcohol to 2 drinks a day for men and 1 drink a day for women. Too much alcohol can cause health problems.     · Avoid getting sick from colds and the flu. Get a pneumococcal vaccine shot. If you have had one before, ask your doctor whether you need another dose. Get a flu shot each year. If you must be around people with colds or the flu, wash your hands often. When should you call for help? Call 911 if you have symptoms of sudden heart failure such as:    · You have severe trouble breathing.     · You cough up pink, foamy mucus.     · You have a new irregular or rapid heartbeat.    Call your doctor now or seek immediate medical care if:    · You have new or increased shortness of breath.     · You are dizzy or lightheaded, or you feel like you may faint.     · You have sudden weight gain, such as more than 2 to 3 pounds in a day or 5 pounds in a week.  (Your doctor may suggest a different range of weight gain.)     · You have increased swelling in your legs, ankles, or feet.     · You are suddenly so tired or weak that you cannot do your usual activities.    Watch closely for changes in your health, and be sure to contact your doctor if you develop new symptoms. Avoiding Triggers With Heart Failure: Care Instructions  Your Care Instructions    Triggers are anything that make your heart failure flare up. A flare-up is also called \"sudden heart failure\" or \"acute heart failure. \" When you have a flare-up, fluid builds up in your lungs, and you have problems breathing. You might need to go to the hospital. By watching for changes in your condition and avoiding triggers, you can prevent heart failure flare-ups. Follow-up care is a key part of your treatment and safety. Be sure to make and go to all appointments, and call your doctor if you are having problems. It's also a good idea to know your test results and keep a list of the medicines you take. How can you care for yourself at home? Watch for changes in your weight and condition  · Weigh yourself without clothing at the same time each day. Record your weight. Call your doctor if you have sudden weight gain, such as more than 2 to 3 pounds in a day or 5 pounds in a week. (Your doctor may suggest a different range of weight gain.) A sudden weight gain may mean that your heart failure is getting worse. · Keep a daily record of your symptoms. Write down any changes in how you feel, such as new shortness of breath, cough, or problems eating. Also record if your ankles are more swollen than usual and if you feel more tired than usual. Note anything that you ate or did that could have triggered these changes. Limit sodium  Sodium causes your body to hold on to extra water. This may cause your heart failure symptoms to get worse. People get most of their sodium from processed foods. Fast food and restaurant meals also tend to be very high in sodium. · Your doctor may suggest that you limit sodium to 2,000 milligrams (mg) a day or less. That is less than 1 teaspoon of salt a day, including all the salt you eat in cooking or in packaged foods.   · Read food labels on cans and food packages. They tell you how much sodium you get in one serving. Check the serving size. If you eat more than one serving, you are getting more sodium. · Be aware that sodium can come in forms other than salt, including monosodium glutamate (MSG), sodium citrate, and sodium bicarbonate (baking soda). MSG is often added to Asian food. You can sometimes ask for food without MSG or salt. · Slowly reducing salt will help you adjust to the taste. Take the salt shaker off the table. · Flavor your food with garlic, lemon juice, onion, vinegar, herbs, and spices instead of salt. Do not use soy sauce, steak sauce, onion salt, garlic salt, mustard, or ketchup on your food, unless it is labeled \"low-sodium\" or \"low-salt. \"  · Make your own salad dressings, sauces, and ketchup without adding salt. · Use fresh or frozen ingredients, instead of canned ones, whenever you can. Choose low-sodium canned goods. · Eat less processed food and food from restaurants, including fast food. Exercise as directed  Moderate, regular exercise is very good for your heart. It improves your blood flow and helps control your weight. But too much exercise can stress your heart and cause a heart failure flare-up. · Check with your doctor before you start an exercise program.  · Walking is an easy way to get exercise. Start out slowly. Gradually increase the length and pace of your walk. Swimming, riding a bike, and using a treadmill are also good forms of exercise. · When you exercise, watch for signs that your heart is working too hard. You are pushing yourself too hard if you cannot talk while you are exercising. If you become short of breath or dizzy or have chest pain, stop, sit down, and rest.  · Do not exercise when you do not feel well. Take medicines correctly  · Take your medicines exactly as prescribed. Call your doctor if you think you are having a problem with your medicine. · Make a list of all the medicines you take. Include those prescribed to you by other doctors and any over-the-counter medicines, vitamins, or supplements you take. Take this list with you when you go to any doctor. · Take your medicines at the same time every day. It may help you to post a list of all the medicines you take every day and what time of day you take them. · Make taking your medicine as simple as you can. Plan times to take your medicines when you are doing other things, such as eating a meal or getting ready for bed. This will make it easier to remember to take your medicines. · Get organized. Use helpful tools, such as daily or weekly pill containers. When should you call for help? Call 911 if you have symptoms of sudden heart failure such as:    · You have severe trouble breathing.     · You cough up pink, foamy mucus.     · You have a new irregular or rapid heartbeat.    Call your doctor now or seek immediate medical care if:    · You have new or increased shortness of breath.     · You are dizzy or lightheaded, or you feel like you may faint.     · You have sudden weight gain, such as more than 2 to 3 pounds in a day or 5 pounds in a week. (Your doctor may suggest a different range of weight gain.)     · You have increased swelling in your legs, ankles, or feet.     · You are suddenly so tired or weak that you cannot do your usual activities.    Watch closely for changes in your health, and be sure to contact your doctor if you develop new symptoms. Where can you learn more? Go to http://sheri-reynaldo.info/. Enter T040 in the search box to learn more about \"Avoiding Triggers With Heart Failure: Care Instructions. \"  Current as of: December 6, 2017  Content Version: 11.7  © 0911-3664 [employer]. Care instructions adapted under license by [employer] (which disclaims liability or warranty for this information).  If you have questions about a medical condition or this instruction, always ask your healthcare professional. [address] any warranty or liability for your use of this information. Heart-Healthy Diet: Care Instructions  Your Care Instructions    A heart-healthy diet has lots of vegetables, fruits, nuts, beans, and whole grains, and is low in salt. It limits foods that are high in saturated fat, such as meats, cheeses, and fried foods. It may be hard to change your diet, but even small changes can lower your risk of heart attack and heart disease. Follow-up care is a key part of your treatment and safety. Be sure to make and go to all appointments, and call your doctor if you are having problems. It's also a good idea to know your test results and keep a list of the medicines you take. How can you care for yourself at home? Watch your portions  · Learn what a serving is. A \"serving\" and a \"portion\" are not always the same thing. Make sure that you are not eating larger portions than are recommended. For example, a serving of pasta is ½ cup. A serving size of meat is 2 to 3 ounces. A 3-ounce serving is about the size of a deck of cards. Measure serving sizes until you are good at [place]" them. Keep in mind that restaurants often serve portions that are 2 or 3 times the size of one serving. · To keep your energy level up and keep you from feeling hungry, eat often but in smaller portions. · Eat only the number of calories you need to stay at a healthy weight. If you need to lose weight, eat fewer calories than your body burns (through exercise and other physical activity). Eat more fruits and vegetables  · Eat a variety of fruit and vegetables every day. Dark green, deep orange, red, or yellow fruits and vegetables are especially good for you. Examples include spinach, carrots, peaches, and berries. · Keep carrots, celery, and other veggies handy for snacks.  Buy fruit that is in season and store it where you can see it so that you will be tempted to eat it.  · Cook dishes that have a lot of veggies in them, such as stir-fries and soups. Limit saturated and trans fat  · Read food labels, and try to avoid saturated and trans fats. They increase your risk of heart disease. Trans fat is found in many processed foods such as cookies and crackers. · Use olive or canola oil when you cook. Try cholesterol-lowering spreads, such as Benecol or Take Control. · Bake, broil, grill, or steam foods instead of frying them. · Choose lean meats instead of high-fat meats such as hot dogs and sausages. Cut off all visible fat when you prepare meat. · Eat fish, skinless poultry, and meat alternatives such as soy products instead of high-fat meats. Soy products, such as tofu, may be especially good for your heart. · Choose low-fat or fat-free milk and dairy products. Eat fish  · Eat at least two servings of fish a week. Certain fish, such as salmon and tuna, contain omega-3 fatty acids, which may help reduce your risk of heart attack. Eat foods high in fiber  · Eat a variety of grain products every day. Include whole-grain foods that have lots of fiber and nutrients. Examples of whole-grain foods include oats, whole wheat bread, and brown rice. · Buy whole-grain breads and cereals, instead of white bread or pastries. Limit salt and sodium  · Limit how much salt and sodium you eat to help lower your blood pressure. · Taste food before you salt it. Add only a little salt when you think you need it. With time, your taste buds will adjust to less salt. · Eat fewer snack items, fast foods, and other high-salt, processed foods. Check food labels for the amount of sodium in packaged foods. · Choose low-sodium versions of canned goods (such as soups, vegetables, and beans). Limit sugar  · Limit drinks and foods with added sugar. These include candy, desserts, and soda pop. Limit alcohol  · Limit alcohol to no more than 2 drinks a day for men and 1 drink a day for women.  Too much alcohol can cause health problems. When should you call for help? Watch closely for changes in your health, and be sure to contact your doctor if:    · You would like help planning heart-healthy meals. Where can you learn more? Go to http://sheri-reynaldo.info/. Enter V137 in the search box to learn more about \"Heart-Healthy Diet: Care Instructions. \"  Current as of: December 6, 2017  Content Version: 11.7  © 6992-3326 [employer]. Care instructions adapted under license by [employer] (which disclaims liability or warranty for this information). If you have questions about a medical condition or this instruction, always ask your healthcare professional. [place] [address] any warranty or liability for your use of this information. DISCHARGE SUMMARY from Nurse    PATIENT INSTRUCTIONS:    After general anesthesia or intravenous sedation, for 24 hours or while taking prescription Narcotics:  · Limit your activities  · Do not drive and operate hazardous machinery  · Do not make important personal or business decisions  · Do  not drink alcoholic beverages  · If you have not urinated within 8 hours after discharge, please contact your surgeon on call. Report the following to your surgeon:  · Excessive pain, swelling, redness or odor of or around the surgical area  · Temperature over 100.5  · Nausea and vomiting lasting longer than 4 hours or if unable to take medications  · Any signs of decreased circulation or nerve impairment to extremity: change in color, persistent  numbness, tingling, coldness or increase pain  · Any questions    What to do at Home:    *  Please give a list of your current medications to your Primary Care Provider. *  Please update this list whenever your medications are discontinued, doses are      changed, or new medications (including over-the-counter products) are added.     *  Please carry medication information at all times in case of emergency situations. These are general instructions for a healthy lifestyle:    No smoking/ No tobacco products/ Avoid exposure to second hand smoke  Surgeon General's Warning:  Quitting smoking now greatly reduces serious risk to your health. Obesity, smoking, and sedentary lifestyle greatly increases your risk for illness    A healthy diet, regular physical exercise & weight monitoring are important for maintaining a healthy lifestyle    You may be retaining fluid if you have a history of heart failure or if you experience any of the following symptoms:  Weight gain of 3 pounds or more overnight or 5 pounds in a week, increased swelling in our hands or feet or shortness of breath while lying flat in bed. Please call your doctor as soon as you notice any of these symptoms; do not wait until your next office visit. Recognize signs and symptoms of STROKE:    F-face looks uneven    A-arms unable to move or move unevenly    S-speech slurred or non-existent    T-time-call 911 as soon as signs and symptoms begin-DO NOT go       Back to bed or wait to see if you get better-TIME IS BRAIN. Warning Signs of HEART ATTACK     Call 911 if you have these symptoms:   Chest discomfort. Most heart attacks involve discomfort in the center of the chest that lasts more than a few minutes, or that goes away and comes back. It can feel like uncomfortable pressure, squeezing, fullness, or pain.  Discomfort in other areas of the upper body. Symptoms can include pain or discomfort in one or both arms, the back, neck, jaw, or stomach.  Shortness of breath with or without chest discomfort.  Other signs may include breaking out in a cold sweat, nausea, or lightheadedness. Don't wait more than five minutes to call 911 - MINUTES MATTER! Fast action can save your life. Calling 911 is almost always the fastest way to get lifesaving treatment.  Emergency Medical Services staff can begin treatment when they arrive -- up to an hour sooner than if someone gets to the hospital by car. The discharge information has been reviewed with the patient. The patient verbalized understanding. Discharge medications reviewed with the patient and appropriate educational materials and side effects teaching were provided.   ___________________________________________________________________________________________________________________________________

## 2018-09-12 NOTE — PROGRESS NOTES
Pt admitted to 3rd floor tele for chronic systolic CHF. CM met with pt to discuss CM needs & DCP. Pt is A&Ox4. Pt is indep at home with all ADLS. Pt lives with SO. Pt has walker & cane; pt will need life vest. Pt has no difficulty with obtaining medications or transport. DCP home with SO. Order and referral sent to [facility]. Alex mcintosh notified. CM to continue to monitor. Care Management Interventions PCP Verified by CM: Yes Last Visit to PCP: 08/05/18 Mode of Transport at Discharge: Other (see comment) Mukesh Ham [phone]) Transition of Care Consult (CM Consult): Discharge Planning Discharge Durable Medical Equipment: Yes (Paco Pereira) Physical Therapy Consult: No 
Occupational Therapy Consult: No 
Speech Therapy Consult: No 
Current Support Network: Own Home (Lives with BF) Confirm Follow Up Transport: Family Plan discussed with Pt/Family/Caregiver: Yes Freedom of Choice Offered: Yes Discharge Location Discharge Placement: Home

## 2018-09-12 NOTE — PROGRESS NOTES
Cardiac Rehab: Spoke with patient regarding referral to cardiac rehab. Patient meets admission criteria based on PCI (9/11/18). Written information about Cardiac Rehab given and reviewed with patient. Discussed lifestyle modifications to promote cardiac wellness. Patient indicated that she may want to participate in the cardiac re[facility] program at [facility] [place] We will forward her outpatient referral for Cardiac Rehab to the [facility] facility as requested. Her Cardiologist is Dr. Brock Rodriguez. Thank you, Can Martinez, JAMN, RN Cardiopulmonary Rehabilitation Nurse Liaison [facility]

## 2018-09-12 NOTE — PROGRESS NOTES
Verbal bedside report received from Lady Newman [place]. Assumed care of patient. R groin assessed ; without bleeding and hematoma.

## 2018-09-12 NOTE — DISCHARGE SUMMARY
[facility] Discharge Summary     Patient ID:  Narayan Patel  [id number]  37 y.o.  1955    Admit date: 9/11/2018    Discharge date:  9/12/2018    Admitting Physician: Mame Antunez MD     Discharge Physician: Dr. Nathan Valdez    Admission Diagnoses: Dyspnea [R06.00]    Discharge Diagnoses:    Diagnosis    [facility]congestive [facility] failure) [facility] [address]    Essential hypertension with goal blood pressure less than 130/85    Mixed hyperlipidemia    Type 2 diabetes mellitus without complication, without long-term current use of insulin (HCC)    Abnormal EKG    Paroxysmal tachycardia ([facility] [address]    Dyspnea       Cardiology Procedures this admission:  Left heart catheterization with PCI  Consults: None    Hospital Course: Patient was seen at the office of [facility] by Dr. Reynold Cleary for complaints of shortness of breath and was subsequently scheduled for an AM Admission LH at [facility] on 9/11/18. Patient underwent cardiac catheterization by Dr. Nathan Valdez. Patient was found to have 60% stenosis of the prox/mid ramus with iFR of 0.87 that was stented with a 3.0 x 30-mm Mario RODRIGO with 0% residual stenosis. Patient was also found to have 50% stenosis of the mid LAD with iFR of 0.97 that will be treated medically. The patient's left ventriculogram reveals a normal-sized ventricle with global hypokinesis and ejection fraction estimated to be about 25-30%. Dr. Nathan Valdez felt her systolic HF likely a component of both mixed ischemic/non-ischemic CM. Patient tolerated the procedure well and was taken to the telemetry floor for recovery. An echocardiogram was performed prior to discharge showing -  Left ventricle: Size was at the upper limits of normal. Systolic function was moderately reduced. Ejection fraction was estimated in the range of 30 % to 35 %. There was severe hypokinesis of the basal-mid inferoseptal wall(s). There was severe global hypokinesis, worst inferiorly. -  Left atrium: The atrium was moderately dilated. -  Mitral valve: There was mild regurgitation. The morning of discharge, patient was up feeling well without any complaints of chest pain or shortness of breath. Patient's right groin cath site was clean, dry and intact without hematoma or bruit. Patient's labs were WNL. Patient was seen and examined by Dr. Tish Johnson and determined stable and ready for discharge. Patient was instructed on the importance of medication compliance including taking aspirin and Brilinta everyday without missing a dose. After receiving drug eluting stents, the patient will remain on dual anti-platelet therapy for 1 year. For maximized medical therapy for CAD, patient will continue BB, ACE-I, and statin as well. For maximized medical therapy for CHF, the patient will continue coreg and ACE-I. The patient will have life vest placed prior to discharge. The patient will follow up with [facility] -- Dr. Jose Figueroa (TC-7) on 9/18/18 at 330 pm in [place] office. Patient has been referred to cardiac rehab. DISPOSITION: The patient is being discharged home in stable condition on a low saturated fat, low cholesterol and low salt diet. The patient is instructed to advance activities as tolerated to the limit of fatigue or shortness of breath. The patient is instructed to avoid all heavy lifting, straining, stooping or squatting for 3-5 days. The patient is instructed to watch the cath site for bleeding/oozing; if seen, the patient is instructed to apply firm pressure with a clean cloth and call [facility] at [phone]. The patient is instructed to watch for signs of infection which include: increasing area of redness, fever/hot to touch or purulent drainage at the catheterization site. The patient is instructed not to soak in a bathtub for 7-10 days, but is cleared to shower.  The patient is instructed to call the office or return to the ER for immediate evaluation for any shortness of breath or chest pain not relieved by NTG. Discharge Exam: Patient has been seen by Dr. Campbell Cowden: see his progress note for exam details.     Visit Vitals    /67    Pulse 82    Temp 98.1 °F (36.7 °C)    Resp 18    Ht 5' 6\" (1.676 m)    Wt 61.3 kg (135 lb 1.6 oz)    SpO2 98%    Breastfeeding No    BMI 21.81 kg/m2       Recent Results (from the past 24 hour(s))   GLUCOSE, POC    Collection Time: 09/11/18 12:29 PM   Result Value Ref Range    Glucose (POC) 133 (H) 65 - 100 mg/dL   EKG, 12 LEAD, INITIAL    Collection Time: 09/11/18 12:34 PM   Result Value Ref Range    Ventricular Rate 73 BPM    Atrial Rate 73 BPM    P-R Interval 160 ms    QRS Duration 94 ms    Q-T Interval 386 ms    QTC Calculation (Bezet) 425 ms    Calculated P Axis 57 degrees    Calculated R Axis 21 degrees    Calculated T Axis 106 degrees    Diagnosis       Sinus rhythm with occasional Premature ventricular complexes  T wave abnormality, consider anterolateral ischemia  Abnormal ECG  No previous ECGs available  Confirmed by MANISHA ROSENBERG ()Mita ([id number]) on 9/11/2018 5:44:28 PM     POC ACTIVATED CLOTTING TIME    Collection Time: 09/11/18  2:04 PM   Result Value Ref Range    Activated Clotting Time (POC) 791 (H) 70 - 128 SECS   EKG, 12 LEAD, INITIAL    Collection Time: 09/11/18  2:59 PM   Result Value Ref Range    Ventricular Rate 74 BPM    Atrial Rate 74 BPM    P-R Interval 178 ms    QRS Duration 94 ms    Q-T Interval 392 ms    QTC Calculation (Bezet) 435 ms    Calculated P Axis 64 degrees    Calculated R Axis 27 degrees    Calculated T Axis 92 degrees    Diagnosis       Sinus rhythm with occasional Premature ventricular complexes  T wave abnormality, consider anterior ischemia  Abnormal ECG  When compared with ECG of 11-SEP-2018 12:34,  No significant change was found  Confirmed by MANISHA ROSENBERG ()Mita ([id number]) on 9/11/2018 5:49:16 PM     GLUCOSE, POC    Collection Time: 09/11/18  4:12 PM   Result Value Ref Range    Glucose (POC) 94 65 - 100 mg/dL   GLUCOSE, POC    Collection Time: 09/11/18  9:18 PM   Result Value Ref Range    Glucose (POC) 172 (H) 65 - 491 mg/dL   METABOLIC PANEL, BASIC    Collection Time: 09/12/18  3:40 AM   Result Value Ref Range    Sodium 138 136 - 145 mmol/L    Potassium 3.6 3.5 - 5.1 mmol/L    Chloride 106 98 - 107 mmol/L    CO2 22 21 - 32 mmol/L    Anion gap 10 7 - 16 mmol/L    Glucose 124 (H) 65 - 100 mg/dL    BUN 18 8 - 23 MG/DL    Creatinine 0.63 0.6 - 1.0 MG/DL    GFR est AA >60 >60 ml/min/1.73m2    GFR est non-AA >60 >60 ml/min/1.73m2    Calcium 9.2 8.3 - 10.4 MG/DL   CBC WITH AUTOMATED DIFF    Collection Time: 09/12/18  3:40 AM   Result Value Ref Range    WBC 7.2 4.3 - 11.1 K/uL    RBC 4.10 4.05 - 5.2 M/uL    HGB 11.2 (L) 11.7 - 15.4 g/dL    HCT 34.1 (L) 35.8 - 46.3 %    MCV 83.2 79.6 - 97.8 FL    MCH 27.3 26.1 - 32.9 PG    MCHC 32.8 31.4 - 35.0 g/dL    RDW 14.5 %    PLATELET 460 222 - 309 K/uL    MPV 9.5 9.4 - 12.3 FL    ABSOLUTE NRBC 0.00 0.0 - 0.2 K/uL    DF AUTOMATED      NEUTROPHILS 68 43 - 78 %    LYMPHOCYTES 24 13 - 44 %    MONOCYTES 6 4.0 - 12.0 %    EOSINOPHILS 1 0.5 - 7.8 %    BASOPHILS 0 0.0 - 2.0 %    IMMATURE GRANULOCYTES 0 0.0 - 5.0 %    ABS. NEUTROPHILS 4.9 1.7 - 8.2 K/UL    ABS. LYMPHOCYTES 1.7 0.5 - 4.6 K/UL    ABS. MONOCYTES 0.5 0.1 - 1.3 K/UL    ABS. EOSINOPHILS 0.1 0.0 - 0.8 K/UL    ABS. BASOPHILS 0.0 0.0 - 0.2 K/UL    ABS. IMM.  GRANS. 0.0 0.0 - 0.5 K/UL   LIPID PANEL    Collection Time: 09/12/18  3:40 AM   Result Value Ref Range    LIPID PROFILE          Cholesterol, total 125 <200 MG/DL    Triglyceride 77 35 - 150 MG/DL    HDL Cholesterol 47 40 - 60 MG/DL    LDL, calculated 62.6 <100 MG/DL    VLDL, calculated 15.4 6.0 - 23.0 MG/DL    CHOL/HDL Ratio 2.7     GLUCOSE, POC    Collection Time: 09/12/18  6:31 AM   Result Value Ref Range    Glucose (POC) 135 (H) 65 - 100 mg/dL   EKG, 12 LEAD, INITIAL    Collection Time: 09/12/18  7:09 AM   Result Value Ref Range    Ventricular Rate 76 BPM Atrial Rate 76 BPM    P-R Interval 170 ms    QRS Duration 94 ms    Q-T Interval 438 ms    QTC Calculation (Bezet) 492 ms    Calculated P Axis 65 degrees    Calculated R Axis 63 degrees    Calculated T Axis -143 degrees    Diagnosis       Normal sinus rhythm  Minimal voltage criteria for LVH, may be normal variant  T wave abnormality, consider inferior ischemia  T wave abnormality, consider anterolateral ischemia  Prolonged QT  Abnormal ECG  When compared with ECG of 11-SEP-2018 14:59,  Premature ventricular complexes are no longer Present  Inverted T waves have replaced nonspecific T wave abnormality in Inferior   leads  Inverted T waves have replaced nonspecific T wave abnormality in Lateral   leads  QT has lengthened           Patient Instructions:   Current Discharge Medication List      START taking these medications    Details   carvedilol (COREG) 3.125 mg tablet Take 1 Tab by mouth two (2) times daily (with meals). Qty: 60 Tab, Refills: 11      lisinopril (PRINIVIL, ZESTRIL) 5 mg tablet Take 1 Tab by mouth two (2) times a day. Qty: 60 Tab, Refills: 11      ticagrelor (BRILINTA) 90 mg tablet Take 1 Tab by mouth every twelve (12) hours every twelve (12) hours. Qty: 60 Tab, Refills: 11      nitroglycerin (NITROSTAT) 0.4 mg SL tablet 1 Tab by SubLINGual route every five (5) minutes as needed for Chest Pain. Up to 3 doses. Qty: 1 Bottle, Refills: 5         CONTINUE these medications which have NOT CHANGED    Details   aspirin delayed-release 81 mg tablet Take 81 mg by mouth two (2) times a day. metFORMIN (GLUCOPHAGE) 1,000 mg tablet Take 1,000 mg by mouth two (2) times daily (with meals). pravastatin (PRAVACHOL) 40 mg tablet Take 40 mg by mouth nightly. prenatal no122/iron/folic acid (PRENATAL MULTI PO) Take  by mouth daily. traMADol (ULTRAM) 50 mg tablet Take 50 mg by mouth two (2) times a day. docusate sodium (STOOL SOFTENER) 100 mg capsule Take 100 mg by mouth two (2) times a day. STOP taking these medications       metoprolol tartrate (LOPRESSOR) 25 mg tablet Comments:   Reason for Stopping:         hydroCHLOROthiazide (HYDRODIURIL) 25 mg tablet Comments:   Reason for Stopping:

## 2018-09-18 PROBLEM — Z98.61 S/P PTCA (PERCUTANEOUS TRANSLUMINAL CORONARY ANGIOPLASTY): Status: ACTIVE | Noted: 2018-09-18

## 2018-09-18 PROBLEM — I42.9 CARDIOMYOPATHY (HCC): Status: ACTIVE | Noted: 2018-09-18

## 2018-11-29 PROBLEM — E11.9 TYPE 2 DIABETES MELLITUS WITHOUT COMPLICATION (HCC): Status: ACTIVE | Noted: 2018-11-29

## 2020-01-14 PROBLEM — I25.5 ISCHEMIC CARDIOMYOPATHY: Status: ACTIVE | Noted: 2020-01-14

## 2021-01-05 PROBLEM — Z01.810 PREOP CARDIOVASCULAR EXAM: Status: ACTIVE | Noted: 2021-01-05

## 2021-02-04 PROBLEM — Z01.810 PREOP CARDIOVASCULAR EXAM: Status: RESOLVED | Noted: 2021-01-05 | Resolved: 2021-02-04

## 2022-03-18 PROBLEM — I25.5 ISCHEMIC CARDIOMYOPATHY: Status: ACTIVE | Noted: 2020-01-14

## 2022-03-18 PROBLEM — E78.2 MIXED HYPERLIPIDEMIA: Status: ACTIVE | Noted: 2018-09-06

## 2022-03-19 PROBLEM — R94.31 ABNORMAL EKG: Status: ACTIVE | Noted: 2018-09-06

## 2022-03-19 PROBLEM — Z98.61 S/P PTCA (PERCUTANEOUS TRANSLUMINAL CORONARY ANGIOPLASTY): Status: ACTIVE | Noted: 2018-09-18

## 2022-03-19 PROBLEM — I50.22 CHRONIC SYSTOLIC CHF (CONGESTIVE HEART FAILURE) (HCC): Status: ACTIVE | Noted: 2018-09-11

## 2022-03-19 PROBLEM — I47.9 PAROXYSMAL TACHYCARDIA (HCC): Status: ACTIVE | Noted: 2018-09-06

## 2022-03-19 PROBLEM — E11.9 TYPE 2 DIABETES MELLITUS WITHOUT COMPLICATION, WITHOUT LONG-TERM CURRENT USE OF INSULIN (HCC): Status: ACTIVE | Noted: 2018-09-06

## 2022-03-19 PROBLEM — E11.9 TYPE 2 DIABETES MELLITUS WITHOUT COMPLICATION (HCC): Status: ACTIVE | Noted: 2018-11-29

## 2022-03-19 PROBLEM — R06.00 DYSPNEA: Status: ACTIVE | Noted: 2018-09-06

## 2022-03-19 PROBLEM — I42.9 CARDIOMYOPATHY (HCC): Status: ACTIVE | Noted: 2018-09-18

## 2022-03-19 PROBLEM — I10 ESSENTIAL HYPERTENSION WITH GOAL BLOOD PRESSURE LESS THAN 130/85: Status: ACTIVE | Noted: 2018-09-06

## 2022-07-07 PROBLEM — I42.9 CARDIOMYOPATHY (HCC): Status: RESOLVED | Noted: 2018-09-18 | Resolved: 2022-07-07

## 2022-07-07 ASSESSMENT — ENCOUNTER SYMPTOMS
SHORTNESS OF BREATH: 0
ABDOMINAL DISTENTION: 0
DIARRHEA: 0
PHOTOPHOBIA: 0
CONSTIPATION: 0
SORE THROAT: 0
ABDOMINAL PAIN: 0

## 2022-07-07 NOTE — PROGRESS NOTES
[facility] CARDIOLOGY  [address] [facility], [address] [facility], [address] [place] [address]  PHONE: [phone]        NAME:  Veronica Farnsworth  : 1955  MRN: [id number]     PCP:  Grazyna Sanches MD      SUBJECTIVE:   Veronica Farnsworth is a 77 y.o. female seen for a follow up visit regarding the following:     Chief Complaint   Patient presents with    Congestive Heart Failure     6 month follow up       HPI:    Previously cared for by Dr. Wilma Bae with known CAD s/p PCI Ramus in  and multiple cardiac risk factors action fraction chronically mildly depressed at 36 to 45% but no recent heart failure symptoms whatsoever and medically maximized. .. She had COVID  in 2020 but is back to normal now and back working without limitations or exertional symptoms. Staying active without any significant limitations other than knee pain, s/p TKR with Dr. Efrain Nunez without adverse cardiac event. No CHF symptoms. BP excellent today. Echo 2022:    Left Ventricle: Left ventricle size is normal. Normal wall thickness. Normal wall motion. Mildly reduced left ventricular systolic function with a visually estimated EF of 45 - 50%. Grade I diastolic dysfunction with normal LAP.   Left Atrium: Left atrium is moderately dilated.   Mitral Valve: Mild transvalvular regurgitation. Doing well since last visit without interval angina, CHF, palpitations, edema, presyncope or syncope. Vitals controlled and tolerating meds well. Not exercising much, counseled today. She has URI symptoms but just finished ABX and is using a steroid nasal inhaler, she will call Dr. Robbie Jernigan to discuss further meds/evaluation today. Past Medical History, Past Surgical History, Family history, Social History, and Medications were all reviewed with the patient today and updated as necessary.      Current Outpatient Medications   Medication Sig Dispense Refill    amoxicillin (AMOXIL) 500 MG capsule Take 500 mg by mouth  aspirin 81 MG EC tablet Take 81 mg by mouth 2 times daily      calcium carb-cholecalciferol 600-400 MG-UNIT TABS per tab Take 1 tablet by mouth 2 times daily      carvedilol (COREG) 6.25 MG tablet TAKE 1 TABLET TWICE DAILY WITH MEALS      Cholecalciferol 50 MCG (2000 UT) TABS Take by mouth      docusate (COLACE, DULCOLAX) 100 MG CAPS Take 100 mg by mouth 2 times daily      Ferrous Fumarate 325 (106 Fe) MG TABS Take by mouth      metFORMIN (GLUCOPHAGE) 1000 MG tablet Take 1,000 mg by mouth 2 times daily      nitroGLYCERIN (NITROSTAT) 0.4 MG SL tablet Place 0.4 mg under the tongue      omeprazole (PRILOSEC) 40 MG delayed release capsule Take 40 mg by mouth daily      pravastatin (PRAVACHOL) 40 MG tablet Take 40 mg by mouth      sacubitril-valsartan (ENTRESTO) 24-26 MG per tablet TAKE 1 TABLET TWICE DAILY      spironolactone (ALDACTONE) 25 MG tablet TAKE 1/2 TABLET EVERY DAY      traMADol (ULTRAM) 50 MG tablet Take 50 mg by mouth 2 times daily. No current facility-administered medications for this visit. Allergies   Allergen Reactions    Oxycodone Other (See Comments) and Rash     Redness to trunk (chest, back)  Red rash all over body along with itching.          Patient Active Problem List    Diagnosis Date Noted    Ischemic cardiomyopathy 01/14/2020    Type 2 diabetes mellitus without complication ([facility] 91/19/4069    S/P PTCA (percutaneous transluminal coronary angioplasty) 09/18/2018    Chronic systolic CHF (congestive heart failure) ([facility] 09/11/2018    Mixed hyperlipidemia 09/06/2018    Paroxysmal tachycardia ([facility] 09/06/2018    Type 2 diabetes mellitus without complication, without long-term current use of insulin ([facility] 09/06/2018    Dyspnea 09/06/2018    Essential hypertension with goal blood pressure less than 130/85 09/06/2018    Abnormal EKG 09/06/2018        Past Surgical History:   Procedure Laterality Date    ORTHOPEDIC SURGERY      knee replacement       History reviewed. No pertinent family history. Social History     Tobacco Use    Smoking status: Never Smoker    Smokeless tobacco: Never Used   Substance Use Topics    Alcohol use: No       ROS:    Review of Systems   Constitutional: Negative for appetite change, chills, diaphoresis and fatigue. HENT: Negative for congestion, mouth sores, nosebleeds, sore throat and tinnitus. Eyes: Negative for photophobia and visual disturbance. Respiratory: Positive for cough. Negative for shortness of breath. Cardiovascular: Negative for chest pain, palpitations and leg swelling. Gastrointestinal: Negative for abdominal distention, abdominal pain, constipation and diarrhea. Endocrine: Negative for cold intolerance, heat intolerance, polydipsia and polyuria. Genitourinary: Negative for dysuria and hematuria. Musculoskeletal: Negative for arthralgias, joint swelling and myalgias. Skin: Negative for rash. Allergic/Immunologic: Negative for environmental allergies and food allergies. Neurological: Negative for dizziness, seizures, syncope and light-headedness. Hematological: Negative for adenopathy. Does not bruise/bleed easily. Psychiatric/Behavioral: Negative for agitation, behavioral problems, dysphoric mood and hallucinations. The patient is not nervous/anxious. PHYSICAL EXAM:     Vitals:    07/08/22 0824   BP: 102/60   Pulse: 81   Weight: 161 lb (73 kg)   Height: 5' 6\" (1.676 m)      Wt Readings from Last 3 Encounters:   07/08/22 161 lb (73 kg)   02/14/22 156 lb (70.8 kg)   01/18/22 156 lb 6.4 oz (70.9 kg)      BP Readings from Last 3 Encounters:   07/08/22 102/60   02/14/22 (!) 144/80   01/18/22 115/75        Physical Exam  Constitutional:       Appearance: Normal appearance. She is normal weight. HENT:      Head: Normocephalic and atraumatic. Nose: Nose normal.      Mouth/Throat:      Mouth: Mucous membranes are moist.      Pharynx: Oropharynx is clear.    Eyes:      Extraocular Movements: Extraocular movements intact. Pupils: Pupils are equal, round, and reactive to light. Neck:      Vascular: No carotid bruit or JVD. Cardiovascular:      Rate and Rhythm: Normal rate and regular rhythm. Heart sounds: No murmur heard. No friction rub. No gallop. Pulmonary:      Effort: Pulmonary effort is normal.      Breath sounds: Normal breath sounds. No wheezing or rhonchi. Abdominal:      General: Abdomen is flat. Bowel sounds are normal. There is no distension. Palpations: Abdomen is soft. Tenderness: There is no abdominal tenderness. Musculoskeletal:         General: No swelling. Normal range of motion. Cervical back: Normal range of motion and neck supple. No tenderness. Skin:     General: Skin is warm and dry. Neurological:      General: No focal deficit present. Mental Status: She is alert and oriented to person, place, and time. Mental status is at baseline. Psychiatric:         Mood and Affect: Mood normal.         Behavior: Behavior normal.          Medical problems and test results were reviewed with the patient today. No results found for: CHOL  No results found for: TRIG  No results found for: HDL  No results found for: LDLCHOLESTEROL, LDLCALC  No results found for: LABVLDL, VLDL  No results found for: CHOLHDLRATIO     No results found for: NA, K, CL, CO2, BUN, CREATININE, GLUCOSE, CALCIUM      No results for input(s): WBC, HGB, HCT, MCV, PLT in the last 720 hours. No results found for: LABA1C  No results found for: EAG     No results found for: BNP     No results found for: TSHFT4, TSH     Results for orders placed or performed in visit on 07/08/22   EKG 12 lead    Impression    Sinus  Rhythm 81 bpm  Normal axis and intervals  Low voltage in precordial leads.    -  Nonspecific T-abnormality. ASSESSMENT and PLAN    Diagnoses and all orders for this visit:      1.  S/P PTCA (percutaneous transluminal coronary angioplasty)- PCI Elianus 9/2018 - stable, continue meds- off Brilinta now. Continue lifelong ASA 81mg daily as tolerated. Lipid surveillance per  Dr. Eric Archer. We will request a copy of next lab draw. 2. Type 2 diabetes mellitus without complication  ([facility]- stable, continue meds      3. Chronic systolic CHF (congestive heart failure) ([address]- stable, continue meds- medically maximized and clinically euvolemic-check echo 1 year. See Feb 2022 echo above. 4. Mixed hyperlipidemia- stable, continue meds-request a copy of recent labs from Dr. Vidal Neumann office      5. Essential hypertension with goal blood pressure less than 130/85- stable, continue meds   Discussed lifestyle modification with plans for low carb/low sugar/low fat diet. Try to eat more lean protein, vegetables, and salads. Try to get at least 20-30min moderate intensity cardiovascular  exercise at least 4-5 times weekly as tolerated with goal of weight loss in the next year. Return in about 6 months (around 1/8/2023).          Steven Zamarripa MD  7/8/2022  8:49 AM

## 2022-07-08 ENCOUNTER — OFFICE VISIT (OUTPATIENT)
Dept: CARDIOLOGY CLINIC | Age: 67
End: 2022-07-08
Payer: COMMERCIAL

## 2022-07-08 VITALS
BODY MASS INDEX: 25.88 KG/M2 | WEIGHT: 161 LBS | SYSTOLIC BLOOD PRESSURE: 102 MMHG | DIASTOLIC BLOOD PRESSURE: 60 MMHG | HEIGHT: 66 IN | HEART RATE: 81 BPM

## 2022-07-08 DIAGNOSIS — I10 ESSENTIAL HYPERTENSION WITH GOAL BLOOD PRESSURE LESS THAN 130/85: ICD-10-CM

## 2022-07-08 DIAGNOSIS — Z98.61 S/P PTCA (PERCUTANEOUS TRANSLUMINAL CORONARY ANGIOPLASTY): ICD-10-CM

## 2022-07-08 DIAGNOSIS — R94.31 ABNORMAL EKG: ICD-10-CM

## 2022-07-08 DIAGNOSIS — E78.2 MIXED HYPERLIPIDEMIA: ICD-10-CM

## 2022-07-08 DIAGNOSIS — I25.5 ISCHEMIC CARDIOMYOPATHY: ICD-10-CM

## 2022-07-08 DIAGNOSIS — I50.22 CHRONIC SYSTOLIC CHF (CONGESTIVE HEART FAILURE) (HCC): Primary | ICD-10-CM

## 2022-07-08 DIAGNOSIS — I42.0 DILATED CARDIOMYOPATHY (HCC): ICD-10-CM

## 2022-07-08 PROCEDURE — 99214 OFFICE O/P EST MOD 30 MIN: CPT | Performed by: INTERNAL MEDICINE

## 2022-07-08 PROCEDURE — 1123F ACP DISCUSS/DSCN MKR DOCD: CPT | Performed by: INTERNAL MEDICINE

## 2022-07-08 PROCEDURE — 93000 ELECTROCARDIOGRAM COMPLETE: CPT | Performed by: INTERNAL MEDICINE

## 2022-07-08 ASSESSMENT — ENCOUNTER SYMPTOMS: COUGH: 1

## 2022-12-28 RX ORDER — SACUBITRIL AND VALSARTAN 24; 26 MG/1; MG/1
TABLET, FILM COATED ORAL
Qty: 60 TABLET | Refills: 3 | Status: SHIPPED | OUTPATIENT
Start: 2022-12-28

## 2022-12-28 RX ORDER — SACUBITRIL AND VALSARTAN 24; 26 MG/1; MG/1
1 TABLET, FILM COATED ORAL 2 TIMES DAILY
Qty: 180 TABLET | Refills: 3 | Status: SHIPPED | OUTPATIENT
Start: 2022-12-28

## 2023-01-04 RX ORDER — CARVEDILOL 6.25 MG/1
TABLET ORAL
Qty: 180 TABLET | Refills: 3 | Status: SHIPPED | OUTPATIENT
Start: 2023-01-04

## 2023-01-04 RX ORDER — PRAVASTATIN SODIUM 40 MG
TABLET ORAL
Qty: 90 TABLET | Refills: 3 | Status: SHIPPED | OUTPATIENT
Start: 2023-01-04

## 2023-01-04 RX ORDER — SPIRONOLACTONE 25 MG/1
TABLET ORAL
Qty: 45 TABLET | Refills: 3 | Status: SHIPPED | OUTPATIENT
Start: 2023-01-04

## 2023-01-13 ASSESSMENT — ENCOUNTER SYMPTOMS
CONSTIPATION: 0
COUGH: 1
SHORTNESS OF BREATH: 0
DIARRHEA: 0
PHOTOPHOBIA: 0
ABDOMINAL DISTENTION: 0
ABDOMINAL PAIN: 0
SORE THROAT: 0

## 2023-01-13 NOTE — PROGRESS NOTES
[facility] CARDIOLOGY  [address] [place] [address], [address]  [place] [address]  PHONE: [phone]        NAME:  Liudmila Escoto  : 1955  MRN: [id number]     PCP:  Carlos Elizabeth MD      SUBJECTIVE:   Liudmila Escoto is a 67 y.o. female seen for a follow up visit regarding the following:     Chief Complaint   Patient presents with    Congestive Heart Failure     6 month follow up       HPI:    Previously cared for by Dr. Deshpande with known CAD s/p PCI Ramus in  and multiple cardiac risk factors action fraction chronically mildly depressed at 40 to 45% but no recent heart failure symptoms whatsoever and medically maximized...  She had COVID in 2020 but is back to normal now and back working without limitations or exertional symptoms.    Staying active without any significant limitations other than knee pain, s/p TKR with Dr. Sims without adverse cardiac event.  No CHF symptoms. BP excellent today.      Echo 2022:    Left Ventricle: Left ventricle size is normal. Normal wall thickness. Normal wall motion. Mildly reduced left ventricular systolic function with a visually estimated EF of 45 - 50%. Grade I diastolic dysfunction with normal LAP.    Left Atrium: Left atrium is moderately dilated.    Mitral Valve: Mild transvalvular regurgitation.    Doing well since last visit without interval angina, CHF, palpitations, edema, presyncope or syncope.  Vitals controlled and tolerating meds well.  Exercising more with no limitations or exertional symptoms.  Clinically euvolemic today.  Compliant with medications and looks good today.  She still has left lower rib cage pain which is clearly musculoskeletal and exacerbated by stooping over.    Past Medical History, Past Surgical History, Family history, Social History, and Medications were all reviewed with the patient today and updated as necessary.     Current Outpatient Medications   Medication Sig Dispense Refill    Prenatal  MV & Min w/FA-DHA (PRENATAL ADULT GUMMY/DHA/FA) 0.4-25 MG CHEW Take by mouth daily      carvedilol (COREG) 6.25 MG tablet TAKE 1 TABLET TWICE DAILY WITH MEALS 180 tablet 3    pravastatin (PRAVACHOL) 40 MG tablet TAKE 1 TABLET EVERY NIGHT 90 tablet 3    spironolactone (ALDACTONE) 25 MG tablet TAKE 1/2 TABLET EVERY DAY 45 tablet 3    sacubitril-valsartan (ENTRESTO) 24-26 MG per tablet Take 1 tablet by mouth 2 times daily TAKE 1 TABLET TWICE DAILY 180 tablet 3    amoxicillin (AMOXIL) 500 MG capsule Take 500 mg by mouth      aspirin 81 MG EC tablet Take 81 mg by mouth 2 times daily      calcium carb-cholecalciferol 600-400 MG-UNIT TABS per tab Take 1 tablet by mouth 2 times daily      Cholecalciferol 50 MCG (2000 UT) TABS Take by mouth      Ferrous Fumarate 325 (106 Fe) MG TABS Take by mouth      metFORMIN (GLUCOPHAGE) 1000 MG tablet Take 1,000 mg by mouth 2 times daily      nitroGLYCERIN (NITROSTAT) 0.4 MG SL tablet Place 0.4 mg under the tongue      omeprazole (PRILOSEC) 40 MG delayed release capsule Take 40 mg by mouth daily      traMADol (ULTRAM) 50 MG tablet Take 50 mg by mouth 2 times daily. No current facility-administered medications for this visit. Allergies   Allergen Reactions    Oxycodone Other (See Comments) and Rash     Redness to trunk (chest, back)  Red rash all over body along with itching.          Patient Active Problem List    Diagnosis Date Noted    Ischemic cardiomyopathy 01/14/2020     Priority: Low    Type 2 diabetes mellitus without complication ([facility] 00/00/8683     Priority: Low    S/P PTCA (percutaneous transluminal coronary angioplasty) 09/18/2018     Priority: Low    Chronic systolic CHF (congestive heart failure) ([facility] 09/11/2018     Priority: Low    Mixed hyperlipidemia 09/06/2018     Priority: Low    Paroxysmal tachycardia ([facility] 09/06/2018     Priority: Low    Type 2 diabetes mellitus without complication, without long-term current use of insulin ([facility] 09/06/2018 Priority: Low    Dyspnea 09/06/2018     Priority: Low    Essential hypertension with goal blood pressure less than 130/85 09/06/2018     Priority: Low    Abnormal EKG 09/06/2018     Priority: Low        Past Surgical History:   Procedure Laterality Date    ORTHOPEDIC SURGERY      knee replacement       History reviewed. No pertinent family history. Social History     Tobacco Use    Smoking status: Never    Smokeless tobacco: Never   Substance Use Topics    Alcohol use: No       ROS:    Review of Systems   Constitutional:  Negative for appetite change, chills, diaphoresis and fatigue. HENT:  Negative for congestion, mouth sores, nosebleeds, sore throat and tinnitus. Eyes:  Negative for photophobia and visual disturbance. Respiratory:  Positive for cough. Negative for shortness of breath. Cardiovascular:  Negative for chest pain, palpitations and leg swelling. Gastrointestinal:  Negative for abdominal distention, abdominal pain, constipation and diarrhea. Endocrine: Negative for cold intolerance, heat intolerance, polydipsia and polyuria. Genitourinary:  Negative for dysuria and hematuria. Musculoskeletal:  Negative for arthralgias, joint swelling and myalgias. Skin:  Negative for rash. Allergic/Immunologic: Negative for environmental allergies and food allergies. Neurological:  Negative for dizziness, seizures, syncope and light-headedness. Hematological:  Negative for adenopathy. Does not bruise/bleed easily. Psychiatric/Behavioral:  Negative for agitation, behavioral problems, dysphoric mood and hallucinations. The patient is not nervous/anxious.        PHYSICAL EXAM:     Vitals:    01/16/23 1554   BP: 122/68   Pulse: 74   Weight: 153 lb 6.4 oz (69.6 kg)   Height: 5' 6\" (1.676 m)      Wt Readings from Last 3 Encounters:   01/16/23 153 lb 6.4 oz (69.6 kg)   07/08/22 161 lb (73 kg)   02/14/22 156 lb (70.8 kg)      BP Readings from Last 3 Encounters:   01/16/23 122/68   07/08/22 102/60 02/14/22 (!) 144/80        Physical Exam  Constitutional:       Appearance: Normal appearance. She is normal weight. HENT:      Head: Normocephalic and atraumatic. Nose: Nose normal.      Mouth/Throat:      Mouth: Mucous membranes are moist.      Pharynx: Oropharynx is clear. Eyes:      Extraocular Movements: Extraocular movements intact. Pupils: Pupils are equal, round, and reactive to light. Neck:      Vascular: No carotid bruit or JVD. Cardiovascular:      Rate and Rhythm: Normal rate and regular rhythm. Heart sounds: No murmur heard. No friction rub. No gallop. Pulmonary:      Effort: Pulmonary effort is normal.      Breath sounds: Normal breath sounds. No wheezing or rhonchi. Abdominal:      General: Abdomen is flat. Bowel sounds are normal. There is no distension. Palpations: Abdomen is soft. Tenderness: There is no abdominal tenderness. Musculoskeletal:         General: No swelling. Normal range of motion. Cervical back: Normal range of motion and neck supple. No tenderness. Skin:     General: Skin is warm and dry. Neurological:      General: No focal deficit present. Mental Status: She is alert and oriented to person, place, and time. Mental status is at baseline. Psychiatric:         Mood and Affect: Mood normal.         Behavior: Behavior normal.        Medical problems and test results were reviewed with the patient today. No results found for: CHOL  No results found for: TRIG  No results found for: HDL  No results found for: LDLCHOLESTEROL, LDLCALC  No results found for: LABVLDL, VLDL  No results found for: CHOLHDLRATIO     No results found for: NA, K, CL, CO2, BUN, CREATININE, GLUCOSE, CALCIUM      No results for input(s): WBC, HGB, HCT, MCV, PLT in the last 720 hours.      No results found for: LABA1C  No results found for: EAG     No results found for: BNP     No results found for: TSHFT4, TSH     Results for orders placed or performed in visit on 01/16/23   EKG 12 lead    Impression    Sinus  Rhythm 74 bpm  Normal axis and intervals  Anterior nonspecific ST-T wave changes        ASSESSMENT and PLAN    Diagnoses and all orders for this visit:      1. S/P PTCA (percutaneous transluminal coronary angioplasty)- PCI Ramus 9/2018 - stable, continue meds- off Brilinta now. Continue lifelong ASA 81mg daily as tolerated. Lipid surveillance per  Dr. Tonio Alexander. We will request a copy of next lab draw. 2. Type 2 diabetes mellitus without complication  ([facility]- stable, continue meds      3. Chronic systolic CHF (congestive heart failure) ([facility]- stable, continue meds- medically maximized and clinically euvolemic-check echo 6 months. .. See Feb 2022 echo above. 4. Mixed hyperlipidemia- stable, continue meds-request a copy of recent labs from Dr. Sunny Bowman office      5. Essential hypertension with goal blood pressure less than 130/85- stable, continue meds   Discussed lifestyle modification with plans for low carb/low sugar/low fat diet. Try to eat more lean protein, vegetables, and salads. Try to get at least 20-30min moderate intensity cardiovascular  exercise at least 4-5 times weekly as tolerated with goal of weight loss in the next year. Return in about 6 months (around 7/16/2023).          Marycruz Figueroa MD  1/16/2023  4:21 PM

## 2023-01-16 ENCOUNTER — OFFICE VISIT (OUTPATIENT)
Dept: CARDIOLOGY CLINIC | Age: 68
End: 2023-01-16
Payer: COMMERCIAL

## 2023-01-16 VITALS
SYSTOLIC BLOOD PRESSURE: 122 MMHG | BODY MASS INDEX: 24.65 KG/M2 | DIASTOLIC BLOOD PRESSURE: 68 MMHG | HEART RATE: 74 BPM | WEIGHT: 153.4 LBS | HEIGHT: 66 IN

## 2023-01-16 DIAGNOSIS — I10 ESSENTIAL HYPERTENSION WITH GOAL BLOOD PRESSURE LESS THAN 130/85: ICD-10-CM

## 2023-01-16 DIAGNOSIS — I25.5 ISCHEMIC CARDIOMYOPATHY: ICD-10-CM

## 2023-01-16 DIAGNOSIS — R94.31 ABNORMAL EKG: ICD-10-CM

## 2023-01-16 DIAGNOSIS — E78.2 MIXED HYPERLIPIDEMIA: ICD-10-CM

## 2023-01-16 DIAGNOSIS — Z98.61 S/P PTCA (PERCUTANEOUS TRANSLUMINAL CORONARY ANGIOPLASTY): ICD-10-CM

## 2023-01-16 DIAGNOSIS — E11.9 TYPE 2 DIABETES MELLITUS WITHOUT COMPLICATION, WITHOUT LONG-TERM CURRENT USE OF INSULIN (HCC): ICD-10-CM

## 2023-01-16 DIAGNOSIS — I50.22 CHRONIC SYSTOLIC CHF (CONGESTIVE HEART FAILURE) (HCC): Primary | ICD-10-CM

## 2023-01-16 DIAGNOSIS — I47.9 PAROXYSMAL TACHYCARDIA (HCC): ICD-10-CM

## 2023-01-16 PROCEDURE — 3074F SYST BP LT 130 MM HG: CPT | Performed by: INTERNAL MEDICINE

## 2023-01-16 PROCEDURE — 3078F DIAST BP <80 MM HG: CPT | Performed by: INTERNAL MEDICINE

## 2023-01-16 PROCEDURE — 99214 OFFICE O/P EST MOD 30 MIN: CPT | Performed by: INTERNAL MEDICINE

## 2023-01-16 PROCEDURE — 1123F ACP DISCUSS/DSCN MKR DOCD: CPT | Performed by: INTERNAL MEDICINE

## 2023-01-16 PROCEDURE — 93000 ELECTROCARDIOGRAM COMPLETE: CPT | Performed by: INTERNAL MEDICINE

## 2023-05-22 RX ORDER — SACUBITRIL AND VALSARTAN 24; 26 MG/1; MG/1
TABLET, FILM COATED ORAL
Qty: 180 TABLET | Refills: 3 | Status: SHIPPED | OUTPATIENT
Start: 2023-05-22 | End: 2023-05-23 | Stop reason: SDUPTHER

## 2023-05-23 RX ORDER — SACUBITRIL AND VALSARTAN 24; 26 MG/1; MG/1
1 TABLET, FILM COATED ORAL 2 TIMES DAILY
Qty: 180 TABLET | Refills: 3 | Status: SHIPPED | OUTPATIENT
Start: 2023-05-23 | End: 2023-05-24 | Stop reason: SDUPTHER

## 2023-05-23 NOTE — TELEPHONE ENCOUNTER
Patient called stating she would like a refill for the following medication :      ENTRESTO 24-26 MG per tablet  [address] [facility] mail order pharmacy    P  [phone]    F  [phone]

## 2023-05-24 RX ORDER — SACUBITRIL AND VALSARTAN 24; 26 MG/1; MG/1
1 TABLET, FILM COATED ORAL 2 TIMES DAILY
Qty: 180 TABLET | Refills: 3 | Status: SHIPPED | OUTPATIENT
Start: 2023-05-24

## 2023-05-24 NOTE — TELEPHONE ENCOUNTER
MEDICATION REFILL REQUEST      Name of Medication:  Ammon Dancer  Dose:  24-26 mg  Frequency:  BID  Days' supply:  80  Quanity:90    Pharmacy Name/Location:  [facility] [phone]

## 2023-07-10 ASSESSMENT — ENCOUNTER SYMPTOMS
DIARRHEA: 0
PHOTOPHOBIA: 0
SORE THROAT: 0
COUGH: 1
ABDOMINAL DISTENTION: 0
ABDOMINAL PAIN: 0
SHORTNESS OF BREATH: 0
CONSTIPATION: 0

## 2023-07-10 NOTE — PROGRESS NOTES
[facility] CARDIOLOGY  [address] [facility], [address]  PHONE: [phone]        NAME:  Harley Cano  : 1955  MRN: [id number]     PCP:  Serjio Payan MD      SUBJECTIVE:   Harley Cano is a 79 y.o. female seen for a follow up visit regarding the following:     Chief Complaint   Patient presents with    Congestive Heart Failure       HPI:    Previously cared for by Dr. Purnima Chavarria with known CAD s/p PCI Ramus in  and multiple cardiac risk factors action fraction chronically mildly depressed at 36 to 45% but no recent heart failure symptoms whatsoever and medically maximized. .. She had COVID in 2020 but is back to normal now and back working without limitations or exertional symptoms. Staying active without any significant limitations other than knee pain, s/p TKR with Dr. Ugo Gonzales without adverse cardiac event. No CHF symptoms. BP excellent today. Echo 2022:    Left Ventricle: Left ventricle size is normal. Normal wall thickness. Normal wall motion. Mildly reduced left ventricular systolic function with a visually estimated EF of 45 - 50%. Grade I diastolic dysfunction with normal LAP. Left Atrium: Left atrium is moderately dilated. Mitral Valve: Mild transvalvular regurgitation. Echo 2023:  Left Ventricle Low normal left ventricular systolic function with a visually estimated EF of 50 - 55%. Left ventricle size is normal. Normal wall thickness. Normal wall motion. Abnormal diastolic function. Left Atrium Left atrium is mildly dilated. Right Ventricle Right ventricle size is normal. Normal systolic function. Right Atrium Right atrium is mildly dilated. Aortic Valve Sclerosis of the aortic valve cusp. Trace regurgitation. No stenosis. Mitral Valve Valve structure is normal. Mild to moderate regurgitation. No stenosis noted. Tricuspid Valve Valve structure is normal. Mild regurgitation. No stenosis noted.  The

## 2023-07-12 ENCOUNTER — OFFICE VISIT (OUTPATIENT)
Age: 68
End: 2023-07-12
Payer: COMMERCIAL

## 2023-07-12 VITALS
HEIGHT: 66 IN | DIASTOLIC BLOOD PRESSURE: 66 MMHG | WEIGHT: 153 LBS | HEART RATE: 68 BPM | BODY MASS INDEX: 24.59 KG/M2 | SYSTOLIC BLOOD PRESSURE: 100 MMHG

## 2023-07-12 DIAGNOSIS — Z98.61 S/P PTCA (PERCUTANEOUS TRANSLUMINAL CORONARY ANGIOPLASTY): ICD-10-CM

## 2023-07-12 DIAGNOSIS — I47.9 PAROXYSMAL TACHYCARDIA (HCC): ICD-10-CM

## 2023-07-12 DIAGNOSIS — E78.2 MIXED HYPERLIPIDEMIA: ICD-10-CM

## 2023-07-12 DIAGNOSIS — E11.9 TYPE 2 DIABETES MELLITUS WITHOUT COMPLICATION, WITHOUT LONG-TERM CURRENT USE OF INSULIN (HCC): ICD-10-CM

## 2023-07-12 DIAGNOSIS — I25.5 ISCHEMIC CARDIOMYOPATHY: ICD-10-CM

## 2023-07-12 DIAGNOSIS — I50.22 CHRONIC SYSTOLIC CHF (CONGESTIVE HEART FAILURE) (HCC): Primary | ICD-10-CM

## 2023-07-12 DIAGNOSIS — I10 ESSENTIAL HYPERTENSION WITH GOAL BLOOD PRESSURE LESS THAN 130/85: ICD-10-CM

## 2023-07-12 PROCEDURE — 1123F ACP DISCUSS/DSCN MKR DOCD: CPT | Performed by: INTERNAL MEDICINE

## 2023-07-12 PROCEDURE — 3078F DIAST BP <80 MM HG: CPT | Performed by: INTERNAL MEDICINE

## 2023-07-12 PROCEDURE — 99214 OFFICE O/P EST MOD 30 MIN: CPT | Performed by: INTERNAL MEDICINE

## 2023-07-12 PROCEDURE — 3074F SYST BP LT 130 MM HG: CPT | Performed by: INTERNAL MEDICINE

## 2023-07-12 RX ORDER — FERROUS SULFATE 325(65) MG
325 TABLET ORAL
COMMUNITY

## 2023-08-07 ENCOUNTER — TELEPHONE (OUTPATIENT)
Age: 68
End: 2023-08-07

## 2023-08-07 NOTE — TELEPHONE ENCOUNTER
Pt c/o persistent cough, non-productive but feels post nasal drip. PCP suggested cough may be from Coreg or Entresto. Pt states has been on both meds ~5 yrs. PCP today changed Pravastatin 40mg to Crestor 5mg daily. Advise stop Pravastatin x2 weeks before starting Crestor. Pt voiced understanding.   cgh

## 2023-08-07 NOTE — TELEPHONE ENCOUNTER
Reviewed per Dr. Margaret Stone. Given hx of longstanding cough ok to try holding Carvedilol x 1 week. Continue Entresto. If no change, resume Carvedilol and hold Entresto. Minimize salt, 2 L fluid restriction, monitor sx. [facility]  Informed pt of Dr. Margaret Stone' response. Advise daily wts, low Na diet, 2L fluid restrictions. Return call, prn.  Pt voiced understanding with verb recall.  [facility]

## 2023-08-14 ENCOUNTER — TELEPHONE (OUTPATIENT)
Age: 68
End: 2023-08-14

## 2023-08-14 NOTE — TELEPHONE ENCOUNTER
Pt held Coreg x 7 days. Cough a little better but still there. Confirm resume Coreg and Hold Entresto per Dr. Lisa Valiente' 8/7/23  orders. Pt voiced understanding and thanks.   cgh

## 2023-08-14 NOTE — TELEPHONE ENCOUNTER
Patient had called last week about her cough. She has stopped her  carvedilol (COREG) 6.25 MG tablet [[id number]]     Order Details  Dose, Route, Frequency: As Directed   Dispense Quantity: 180 tablet Refills: 3          Sig: TAKE 1 TABLET TWICE DAILY WITH MEALS   Patient taking differently: Challenge x 1 week due to cough/resume if no change. TAKE 1 TABLET TWICE DAILY WITH MEALS     And seems to think it has helped plus his regular doctor Dr. Madeline Sorensen gave him something for  the cough. She said the cough is still there but a little better. Please call to advise.

## 2023-08-22 ENCOUNTER — TELEPHONE (OUTPATIENT)
Age: 68
End: 2023-08-22

## 2023-08-22 NOTE — TELEPHONE ENCOUNTER
Pt states that she has been talking to [place] about medication and she has a few more questions and would like a call back.

## 2023-08-22 NOTE — TELEPHONE ENCOUNTER
Severa Baumann, MD Bearl Medal, RN  Caller: Unspecified (Today, 12:45 PM)  The Hansa Busing and the Coreg should not cause coughing. Agree with seeing PCP     Reviewed Dr. Nikki Chappell response with pt. Verb understanding.

## 2023-08-22 NOTE — TELEPHONE ENCOUNTER
Pt states cough has improved, but not gone completely. States continues to have cough when she wakes up and after eating. Admits to feeling like she has something draining down the back of her throat, but never coughs anything up. Has continued to take her carvedilol as prescribed. Asking if she should start back on entresto and/or should she change from carvedilol to something else. States she thinks cough improved off of carvedilol more than off of entresto, however pt was also taking tessalon regularly when off of carvedilol. Instructed pt to f/u with pcp to make aware of possible post-nasal drip contributing to sx. Please advise regarding carvedilol entresto.

## 2023-08-22 NOTE — TELEPHONE ENCOUNTER
Patient did go off this medication for 1 week as instructed:  carvedilol (COREG) 6.25 MG tablet [[id number]]     Order Details  Dose, Route, Frequency: As Directed   Dispense Quantity: 180 tablet Refills: 3          Sig: TAKE 1 TABLET TWICE DAILY WITH MEALS   Patient taking differently: Challenge x 1 week due to cough/resume if no change. TAKE 1 TABLET TWICE DAILY WITH MEALS     Then patient went off of this one week also:  sacubitril-valsartan (ENTRESTO) 24-26 MG per tablet [[id number]]     Order Details  Dose: 1 tablet Route: Oral Frequency: 2 TIMES DAILY   Dispense Quantity: 180 tablet Refills: 3          Sig: Take 1 tablet by mouth 2 times daily   Patient taking differently: Take 1 tablet by mouth 2 times daily Challenge x 1 week due to cough. If no change resume normal dose. Her PCP put her on the cough pearls also. She still has some coughing and wants instructions on where to go from here. Please call patient to advise.

## 2023-08-22 NOTE — TELEPHONE ENCOUNTER
Spoke to pt, asking if she should continue non-medical methods to manage her CHF including daily weights, fluid restriction, minimizing dietary sodium. Instructed pt she should continue these activities. Verb understanding.

## 2023-11-01 ENCOUNTER — OFFICE VISIT (OUTPATIENT)
Dept: UROGYNECOLOGY | Age: 68
End: 2023-11-01
Payer: COMMERCIAL

## 2023-11-01 VITALS — BODY MASS INDEX: 24.66 KG/M2 | HEIGHT: 65 IN | WEIGHT: 148 LBS

## 2023-11-01 DIAGNOSIS — N81.10 VAGINAL PROLAPSE: Primary | ICD-10-CM

## 2023-11-01 DIAGNOSIS — N81.89 WEAKNESS OF PELVIC FLOOR: ICD-10-CM

## 2023-11-01 PROBLEM — N39.41 URGE INCONTINENCE: Status: ACTIVE | Noted: 2023-11-01

## 2023-11-01 LAB
BILIRUBIN, URINE, POC: NEGATIVE
BLOOD URINE, POC: NEGATIVE
GLUCOSE URINE, POC: NEGATIVE
KETONES, URINE, POC: NORMAL
LEUKOCYTE ESTERASE, URINE, POC: NEGATIVE
NITRITE, URINE, POC: NEGATIVE
PH, URINE, POC: 5.5 (ref 4.6–8)
PROTEIN,URINE, POC: NORMAL
SPECIFIC GRAVITY, URINE, POC: >=1.03 (ref 1–1.03)
URINALYSIS CLARITY, POC: CLEAR
URINALYSIS COLOR, POC: YELLOW
UROBILINOGEN, POC: NORMAL

## 2023-11-01 PROCEDURE — 81003 URINALYSIS AUTO W/O SCOPE: CPT | Performed by: OBSTETRICS & GYNECOLOGY

## 2023-11-01 PROCEDURE — 99204 OFFICE O/P NEW MOD 45 MIN: CPT | Performed by: OBSTETRICS & GYNECOLOGY

## 2023-11-01 PROCEDURE — 51701 INSERT BLADDER CATHETER: CPT | Performed by: OBSTETRICS & GYNECOLOGY

## 2023-11-01 RX ORDER — ROSUVASTATIN CALCIUM 10 MG/1
10 TABLET, COATED ORAL DAILY
COMMUNITY

## 2023-11-01 NOTE — PROGRESS NOTES
[facility] UROGYNECOLOGY  [address]  Dept: [phone]        PCP:  Lorella Buerger, MD    11/1/2023        HPI:  I am being asked to see this patient in consultation by Dr. Meghann North   for New Patient (Vaginal Prolapse)  . Ms. Jeff Gillette has been experiencing prolapse for few years. The prolapse does cause her pain and/or pressure. She does have to splint to complete urination, a BM, or for comfort. Bending over makes her prolapse symptoms worse. splinting makes her prolapse symptoms better. She has not tried a pessary, she has not tried physical therapy, she has not  had surgery for her POP. Ms. Jeff Gillette  has been leaking urine for 6 months to year. She leaks urine neither daytime/ night time. She sometimes leak urine with cough, laugh, sneeze and activity. She sometimes leak urine with urgency. She does not use pad. She leaks 10 or less a month times . When she leaks she leaks small amounts. She has not tried PT, she has not tried medication . She has not had procedures/surgery for this in the past.     With both urge incontinence and stress incontinence, Urgency incontinence bothers her the most.       She voids 3-5 times during the day. She voids 1-2 times over night. She has 7-14 BM per week, and does not strain. She drinks 2 caffeine drinks beverages per day. Coffee 1 cup in am  1 Crystal light pack   She uses 5 packets artificial sweeteners per day. She drinks 0 alcoholic beverages per week. She has not pelvic surgery in the past.   Her last PAP:   2022  Her last Colonoscopy: [phone]  Her last Mammogram: 2022    She does have a history of DM. 7.0% in Aug 2023      She does not have a history of sleep apnea. Tobacco: No    Sexual History: not sexually active. Notes were reviewed from the referring provider Dr Meghann North.   Results were reviewed from prior tests:     Results for orders placed or performed in visit on 11/01/23

## 2023-11-01 NOTE — ASSESSMENT & PLAN NOTE
We discussed the epidemiology, pathogenesis and etiology of pelvic organ prolapse. This is a  condition that has progressed. We discussed the potential risk factors which include genetics and environmental factors, such as childbirth, aging, menopause, straining, and previous surgery. I offered her management options which included nothing, pessary, and surgery. She is interested in: thinking about her options. Decisions regarding major surgery were discussed today. If she is interested in a pessary- we can set her up for a fitting. If she is interested in surgery, we can set her up for UDS. Pessary info and surgery info given today.

## 2023-12-01 ENCOUNTER — NURSE ONLY (OUTPATIENT)
Dept: UROGYNECOLOGY | Age: 68
End: 2023-12-01
Payer: COMMERCIAL

## 2023-12-01 DIAGNOSIS — N81.10 VAGINAL PROLAPSE: Primary | ICD-10-CM

## 2023-12-01 LAB
BILIRUBIN, URINE, POC: NEGATIVE
BLOOD URINE, POC: NEGATIVE
GLUCOSE URINE, POC: NEGATIVE
KETONES, URINE, POC: NEGATIVE
LEUKOCYTE ESTERASE, URINE, POC: NEGATIVE
NITRITE, URINE, POC: NEGATIVE
PH, URINE, POC: 6 (ref 4.6–8)
PROTEIN,URINE, POC: NEGATIVE
SPECIFIC GRAVITY, URINE, POC: >=1.03 (ref 1–1.03)
URINALYSIS CLARITY, POC: CLEAR
URINALYSIS COLOR, POC: YELLOW
UROBILINOGEN, POC: NORMAL

## 2023-12-01 PROCEDURE — 81003 URINALYSIS AUTO W/O SCOPE: CPT | Performed by: OBSTETRICS & GYNECOLOGY

## 2023-12-05 ENCOUNTER — OFFICE VISIT (OUTPATIENT)
Dept: UROGYNECOLOGY | Age: 68
End: 2023-12-05
Payer: COMMERCIAL

## 2023-12-05 ENCOUNTER — PROCEDURE VISIT (OUTPATIENT)
Dept: UROGYNECOLOGY | Age: 68
End: 2023-12-05
Payer: COMMERCIAL

## 2023-12-05 DIAGNOSIS — N39.41 URGE INCONTINENCE: ICD-10-CM

## 2023-12-05 DIAGNOSIS — N81.10 VAGINAL PROLAPSE: ICD-10-CM

## 2023-12-05 DIAGNOSIS — N81.89 WEAKNESS OF PELVIC FLOOR: ICD-10-CM

## 2023-12-05 DIAGNOSIS — E11.9 TYPE 2 DIABETES MELLITUS WITHOUT COMPLICATION, WITHOUT LONG-TERM CURRENT USE OF INSULIN (HCC): Primary | ICD-10-CM

## 2023-12-05 DIAGNOSIS — N81.10 VAGINAL PROLAPSE: Primary | ICD-10-CM

## 2023-12-05 PROCEDURE — 51729 CYSTOMETROGRAM W/VP&UP: CPT | Performed by: OBSTETRICS & GYNECOLOGY

## 2023-12-05 PROCEDURE — 51741 ELECTRO-UROFLOWMETRY FIRST: CPT | Performed by: OBSTETRICS & GYNECOLOGY

## 2023-12-05 PROCEDURE — 1123F ACP DISCUSS/DSCN MKR DOCD: CPT | Performed by: OBSTETRICS & GYNECOLOGY

## 2023-12-05 PROCEDURE — 51784 ANAL/URINARY MUSCLE STUDY: CPT | Performed by: OBSTETRICS & GYNECOLOGY

## 2023-12-05 PROCEDURE — 51797 INTRAABDOMINAL PRESSURE TEST: CPT | Performed by: OBSTETRICS & GYNECOLOGY

## 2023-12-05 PROCEDURE — 99215 OFFICE O/P EST HI 40 MIN: CPT | Performed by: OBSTETRICS & GYNECOLOGY

## 2023-12-05 NOTE — ASSESSMENT & PLAN NOTE
We discussed the epidemiology, pathogenesis and etiology of pelvic organ prolapse. We discussed the potential risk factors which include genetics and environmental factors, such as childbirth, aging, menopause, straining, and previous surgery. I offered her management options which included nothing, pessary, and surgery. We discussed her options of correcting the prolapse through a vaginal approach and laparoscopic approach. We also discussed repairing the vaginal prolapse with mesh and the option to do this without mesh. She has decided she would like to have a Native tissue anterior and apical repair and cystoscopy. We discussed the surgical technique involved in the prolapse repair. I described the anatomic principles to the surgery as well as the success rates of conventional and compensatory repairs. There is a 20% chance of recurrence and 15% reoperation rate. I described the surgical technique to be employed for her upcoming surgery. We discussed the anticipated postoperative course. Risks, benefits, indications and alternatives of the surgery were discussed. Risks reviewed with the patient include bleeding, infections, injury to pelvic organs (bowel, bladder, urethra, ureters, blood vessels, and nerves), recurrence, dyspareunia, anesthetic complications, and death. We discussed that other complications could arise and that the list is too long to discuss comprehensively. We discussed the cause of uterine prolapse. We discussed that the uterus itself is usually normal. We discussed the options of prolapse repair with hysterectomy or prolapse repair with uterine suspension. We discussed the risk of uterine cancer. We discussed that some patients do fail, although not all require another surgery. We discussed the risks of repair which include pain, dysparunia, bladder and/or bowel dysfunction and sexual dysfunction.      We discussed that the success of a native tissue repair is based on

## 2023-12-05 NOTE — PATIENT INSTRUCTIONS
Patient Education        vibegron  Pronunciation:  mirna Gaitan  What is the most important information I should know about vibegron? Follow all directions on your medicine label and package. Tell each of your healthcare providers about all your medical conditions, allergies, and all medicines you use. What is vibegron? Gwyn Gustine is used in adults to treat the symptoms of overactive bladder, including urgency, frequency, and incontinence. Gwyn Gustine may also be used for purposes not listed in this medication guide. What should I discuss with my healthcare provider before taking vibegron? You should not use vibegron if you are allergic to it. Tell your doctor if you have ever had:  trouble emptying your bladder;  a weak stream of urine;  liver disease; or  kidney disease. Tell your doctor if you are pregnant or breastfeeding. Gwyn Colin is not approved for use by anyone younger than 25years old. How should I take vibegron? Follow all directions on your prescription label and read all medication guides or instruction sheets. Use the medicine exactly as directed. Take this medicine with a full glass of water. You may take vibegron with or without food. If you cannot swallow a tablet whole, you may crush the tablet and mix it with 1 tablespoon of applesauce. Swallow the mixture right away with a glass of water. Do not save the mixture for later use. Store at room temperature away from moisture and heat. Do not keep leftover vibegron. Ask your pharmacist where to locate a drug take-back disposal program. If there is no take-back program, mix the leftover medicine with cat litter or coffee grounds in a sealed plastic bag throw the bag in the trash. What happens if I miss a dose? Take the medicine as soon as you can, but skip the missed dose if it is almost time for your next dose. Do not take two doses at one time. What happens if I overdose?   Seek emergency medical attention or call the

## 2023-12-05 NOTE — ASSESSMENT & PLAN NOTE
We will need an updated HgA1C. She knows that having DM increases her risk of complication from surgery including but not limited to infections and poor healing.

## 2024-01-03 ENCOUNTER — OFFICE VISIT (OUTPATIENT)
Dept: UROGYNECOLOGY | Age: 69
End: 2024-01-03
Payer: COMMERCIAL

## 2024-01-03 DIAGNOSIS — N81.10 VAGINAL PROLAPSE: Primary | ICD-10-CM

## 2024-01-03 PROCEDURE — 1123F ACP DISCUSS/DSCN MKR DOCD: CPT | Performed by: OBSTETRICS & GYNECOLOGY

## 2024-01-03 PROCEDURE — 99212 OFFICE O/P EST SF 10 MIN: CPT | Performed by: OBSTETRICS & GYNECOLOGY

## 2024-01-03 NOTE — PROGRESS NOTES
[facility] UR[facility]YNECOLOGY  [address]  [place] [id number]  Dept: [phone]    PCP:  Carlos Elizabeth MD    1/3/2024      HPI:  Liudmila CATE Qureshiald is here to follow up on Follow-up  .    Patient is here for Vibegron follow up. She is experiencing some constipation since stating. Her most recent A1c 6.9% on 12-    She is voiding less during the day and getting up 1 time at night. She is very happy with this. She is not leaking at all.     No results found for this visit on 01/03/24.    There were no vitals taken for this visit.        1. Vaginal prolapse  Assessment & Plan:  She is doing really well on the vibegron.  We will cont with the vibegron.       No follow-ups on file.                      Joana Wagner, DO

## 2024-01-05 RX ORDER — SPIRONOLACTONE 25 MG/1
TABLET ORAL
Qty: 45 TABLET | Refills: 3 | Status: SHIPPED | OUTPATIENT
Start: 2024-01-05

## 2024-01-14 NOTE — PROGRESS NOTES
stenosis noted.   Aorta Normal sized aortic root.   Pericardium No pericardial effusion.     Doing well since last visit without interval angina, CHF, palpitations, edema, presyncope or syncope.  Vitals controlled and tolerating meds well.  Exercising more with no limitations or exertional symptoms.  Clinically euvolemic today.  Compliant with medications and looks good today. She is medically maximized and will continue GDMT for CHF without change today.   Discussed lifestyle modification with plans for low carb/low sugar/low fat diet.  Try to eat more lean protein, vegetables, and salads.  Try to get at least 20-30min moderate intensity cardiovascular exercise at least 4-5 times weekly as tolerated with goal of weight loss in the next year.       Past Medical History, Past Surgical History, Family history, Social History, and Medications were all reviewed with the patient today and updated as necessary.     Current Outpatient Medications   Medication Sig Dispense Refill    docusate sodium (COLACE) 100 MG capsule Take 1 capsule by mouth 2 times daily      carvedilol (COREG) 6.25 MG tablet Take 1 tablet by mouth 2 times daily (with meals) 180 tablet 3    sacubitril-valsartan (ENTRESTO) 24-26 MG per tablet Take 1 tablet by mouth 2 times daily 180 tablet 3    spironolactone (ALDACTONE) 25 MG tablet Take 0.5 tablets by mouth daily 45 tablet 3    vibegron (GEMTESA) 75 MG TABS tablet Take 1 tablet by mouth daily 30 tablet 11    rosuvastatin (CRESTOR) 10 MG tablet Take 1 tablet by mouth daily      ferrous sulfate (IRON 325) 325 (65 Fe) MG tablet Take 1 tablet by mouth daily (with breakfast) 3 days a week      Prenatal MV & Min w/FA-DHA (PRENATAL ADULT GUMMY/DHA/FA) 0.4-25 MG CHEW Take by mouth daily      amoxicillin (AMOXIL) 500 MG capsule Take 1 capsule by mouth      aspirin 81 MG EC tablet Take 1 tablet by mouth 2 times daily      calcium carb-cholecalciferol 600-400 MG-UNIT TABS per tab Take 1 tablet by mouth 2 times

## 2024-01-16 ENCOUNTER — OFFICE VISIT (OUTPATIENT)
Age: 69
End: 2024-01-16
Payer: COMMERCIAL

## 2024-01-16 VITALS
HEIGHT: 65 IN | BODY MASS INDEX: 24.83 KG/M2 | DIASTOLIC BLOOD PRESSURE: 72 MMHG | HEART RATE: 64 BPM | WEIGHT: 149 LBS | SYSTOLIC BLOOD PRESSURE: 124 MMHG

## 2024-01-16 DIAGNOSIS — E78.2 MIXED HYPERLIPIDEMIA: ICD-10-CM

## 2024-01-16 DIAGNOSIS — I25.5 ISCHEMIC CARDIOMYOPATHY: ICD-10-CM

## 2024-01-16 DIAGNOSIS — I10 ESSENTIAL HYPERTENSION WITH GOAL BLOOD PRESSURE LESS THAN 130/85: ICD-10-CM

## 2024-01-16 DIAGNOSIS — Z98.61 S/P PTCA (PERCUTANEOUS TRANSLUMINAL CORONARY ANGIOPLASTY): ICD-10-CM

## 2024-01-16 DIAGNOSIS — I47.9 PAROXYSMAL TACHYCARDIA (HCC): ICD-10-CM

## 2024-01-16 DIAGNOSIS — I50.22 CHRONIC SYSTOLIC CHF (CONGESTIVE HEART FAILURE) (HCC): Primary | ICD-10-CM

## 2024-01-16 PROCEDURE — 93000 ELECTROCARDIOGRAM COMPLETE: CPT | Performed by: INTERNAL MEDICINE

## 2024-01-16 PROCEDURE — 3078F DIAST BP <80 MM HG: CPT | Performed by: INTERNAL MEDICINE

## 2024-01-16 PROCEDURE — 1123F ACP DISCUSS/DSCN MKR DOCD: CPT | Performed by: INTERNAL MEDICINE

## 2024-01-16 PROCEDURE — 3074F SYST BP LT 130 MM HG: CPT | Performed by: INTERNAL MEDICINE

## 2024-01-16 PROCEDURE — 99214 OFFICE O/P EST MOD 30 MIN: CPT | Performed by: INTERNAL MEDICINE

## 2024-01-16 RX ORDER — SPIRONOLACTONE 25 MG/1
12.5 TABLET ORAL DAILY
Qty: 45 TABLET | Refills: 3 | Status: SHIPPED | OUTPATIENT
Start: 2024-01-16

## 2024-01-16 RX ORDER — SACUBITRIL AND VALSARTAN 24; 26 MG/1; MG/1
1 TABLET, FILM COATED ORAL 2 TIMES DAILY
Qty: 180 TABLET | Refills: 3 | Status: SHIPPED | OUTPATIENT
Start: 2024-01-16 | End: 2024-01-18 | Stop reason: SDUPTHER

## 2024-01-16 RX ORDER — DOCUSATE SODIUM 100 MG/1
100 CAPSULE, LIQUID FILLED ORAL 2 TIMES DAILY
COMMUNITY

## 2024-01-16 RX ORDER — CARVEDILOL 6.25 MG/1
6.25 TABLET ORAL 2 TIMES DAILY WITH MEALS
Qty: 180 TABLET | Refills: 3 | Status: SHIPPED | OUTPATIENT
Start: 2024-01-16

## 2024-01-18 RX ORDER — SACUBITRIL AND VALSARTAN 24; 26 MG/1; MG/1
1 TABLET, FILM COATED ORAL 2 TIMES DAILY
Qty: 60 TABLET | Refills: 11 | Status: SHIPPED | OUTPATIENT
Start: 2024-01-18

## 2024-01-18 RX ORDER — SACUBITRIL AND VALSARTAN 24; 26 MG/1; MG/1
1 TABLET, FILM COATED ORAL 2 TIMES DAILY
Qty: 60 TABLET | Refills: 0 | Status: SHIPPED | OUTPATIENT
Start: 2024-01-18 | End: 2024-01-18

## 2024-01-18 NOTE — TELEPHONE ENCOUNTER
Patient is asking if samples are available of Entresto 24-26 mg. Her mail order has not come in yet and she is down to 2 pills.

## 2024-01-18 NOTE — TELEPHONE ENCOUNTER
I called and spoke to pt, I explained that we do not receive Entresto samples. She asked that we send in a 30 day supply to her local pharmacy.     Requested Prescriptions     Pending Prescriptions Disp Refills    sacubitril-valsartan (ENTRESTO) 24-26 MG per tablet 60 tablet 0     Sig: Take 1 tablet by mouth 2 times daily

## 2024-01-22 ENCOUNTER — TELEPHONE (OUTPATIENT)
Age: 69
End: 2024-01-22

## 2024-01-22 NOTE — TELEPHONE ENCOUNTER
----- Message from Brent Sanders MD sent at 1/22/2024  3:17 PM EST -----  Labs from Dr. Elizabeth reviewed.  Everything looks great.  Kidney function, liver function, electrolytes look good.  Hemoglobin normal.  Triglycerides and cholesterol are great.  Continue meds, healthy diet and lifestyle and keep routine follow-up.

## 2024-03-08 ENCOUNTER — TELEPHONE (OUTPATIENT)
Dept: UROGYNECOLOGY | Age: 69
End: 2024-03-08

## 2024-03-08 NOTE — TELEPHONE ENCOUNTER
----- Message from Kinga Peters RN sent at 2/19/2024  4:21 PM EST -----  Regarding: gemtesa cost  She called looking for help with payments on her gemtesa.

## 2024-03-08 NOTE — TELEPHONE ENCOUNTER
Insurance was called for an TE, call ref # [id number] it will be 3-5 days for turnaround and they will fax a response.

## 2024-04-03 ENCOUNTER — OFFICE VISIT (OUTPATIENT)
Dept: UROGYNECOLOGY | Age: 69
End: 2024-04-03
Payer: COMMERCIAL

## 2024-04-03 DIAGNOSIS — N81.10 VAGINAL PROLAPSE: Primary | ICD-10-CM

## 2024-04-03 PROCEDURE — 99459 PELVIC EXAMINATION: CPT | Performed by: OBSTETRICS & GYNECOLOGY

## 2024-04-03 PROCEDURE — 99215 OFFICE O/P EST HI 40 MIN: CPT | Performed by: OBSTETRICS & GYNECOLOGY

## 2024-04-03 PROCEDURE — 1123F ACP DISCUSS/DSCN MKR DOCD: CPT | Performed by: OBSTETRICS & GYNECOLOGY

## 2024-04-03 NOTE — PROGRESS NOTES
Posterior Wall (Bp) -2 -2   Posterior Fornix (D) -6 -7          1. Vaginal prolapse  Assessment & Plan:  We discussed the epidemiology, pathogenesis and etiology of pelvic organ prolapse. We discussed the potential risk factors which include genetics and environmental factors, such as childbirth, aging, menopause, straining, and previous surgery. I offered her management options which included nothing, pessary, and surgery.      We discussed her options of correcting the prolapse through a vaginal approach and laparoscopic approach. We also discussed repairing the vaginal prolapse with mesh and the option to do this without mesh. She has decided she would like to have a Native tissue anterior and apical repair and cystoscopy.       We discussed the surgical technique involved in the prolapse repair. I described the anatomic principles to the surgery as well as the success rates of conventional and compensatory repairs.     There is a 20% chance of recurrence and 15% reoperation rate.      I described the surgical technique to be employed for her upcoming surgery. We discussed the anticipated postoperative course.     Risks, benefits, indications and alternatives of the surgery were discussed. Risks reviewed with the patient include bleeding, infections, injury to pelvic organs (bowel, bladder, urethra, ureters, blood vessels, and nerves), recurrence, dyspareunia, anesthetic complications, and death. We discussed that other complications could arise and that the list is too long to discuss comprehensively.     We discussed the cause of uterine prolapse. We discussed that the uterus itself is usually normal. We discussed the options of prolapse repair with hysterectomy or prolapse repair with uterine suspension. We discussed the risk of uterine cancer. We discussed that some patients do fail, although not all require another surgery. We discussed the risks of repair which include pain, dysparunia, bladder and/or

## 2024-04-03 NOTE — ASSESSMENT & PLAN NOTE
We discussed the epidemiology, pathogenesis and etiology of pelvic organ prolapse. We discussed the potential risk factors which include genetics and environmental factors, such as childbirth, aging, menopause, straining, and previous surgery. I offered her management options which included nothing, pessary, and surgery.      We discussed her options of correcting the prolapse through a vaginal approach and laparoscopic approach. We also discussed repairing the vaginal prolapse with mesh and the option to do this without mesh. She has decided she would like to have a Native tissue anterior and apical repair and cystoscopy.       We discussed the surgical technique involved in the prolapse repair. I described the anatomic principles to the surgery as well as the success rates of conventional and compensatory repairs.     There is a 20% chance of recurrence and 15% reoperation rate.      I described the surgical technique to be employed for her upcoming surgery. We discussed the anticipated postoperative course.     Risks, benefits, indications and alternatives of the surgery were discussed. Risks reviewed with the patient include bleeding, infections, injury to pelvic organs (bowel, bladder, urethra, ureters, blood vessels, and nerves), recurrence, dyspareunia, anesthetic complications, and death. We discussed that other complications could arise and that the list is too long to discuss comprehensively.     We discussed the cause of uterine prolapse. We discussed that the uterus itself is usually normal. We discussed the options of prolapse repair with hysterectomy or prolapse repair with uterine suspension. We discussed the risk of uterine cancer. We discussed that some patients do fail, although not all require another surgery. We discussed the risks of repair which include pain, dysparunia, bladder and/or bowel dysfunction and sexual dysfunction.      We discussed that the success of a native tissue repair is

## 2024-04-17 ENCOUNTER — PREP FOR PROCEDURE (OUTPATIENT)
Dept: UROGYNECOLOGY | Age: 69
End: 2024-04-17

## 2024-04-17 ENCOUNTER — TELEPHONE (OUTPATIENT)
Age: 69
End: 2024-04-17

## 2024-04-17 DIAGNOSIS — N81.3 UTEROVAGINAL PROLAPSE, COMPLETE: ICD-10-CM

## 2024-04-17 NOTE — TELEPHONE ENCOUNTER
----- Message from Brent Sanders MD sent at 4/17/2024 12:16 PM EDT -----  Low to moderate risk for bladder surgery, proceed, continue low-dose aspirin through the procedure if possible.  If not possible, okay to interrupt 4 to 5 days prior to the procedure, resuming postoperatively ASAP.  Continue other cardiac meds throughout the procedure uninterrupted.  ----- Message -----  From: Philipp Burgos  Sent: 4/17/2024  11:40 AM EDT  To: Brent Sanders MD

## 2024-05-02 NOTE — PROGRESS NOTES
External Labs dated 04/10/24 received, within anesthesia guidelines, scanned into Media if needed for reference.

## 2024-05-03 NOTE — H&P
hemorrhoids and no masses or lesions of the perineum        POP-Q: (Pelvic Organ Prolapse - Quantification Exam):      11/1/2023     1:00 PM   Pelvic Organ Prolapse Quantification   Anterior Wall (Aa) 2   Anterior Wall (Ba) 2   Cervix or Cuff (C) -3   Genital Haitus (gh) 3   Perineal Body (pb) 2   Total Vaginal Length (tvl) 8   Posterior Wall (Ap) -2   Posterior Wall (Bp) -2   Posterior Fornix (D) -7       Pelvic floor muscles: Tender Spasm     R. Puborectalis: NO 0 /5    L. Puborectalis: NO 0 /5    R. Pubococcyg NO 0 /5    L. Pubococcyg NO 0 /5    R. Ileococcyg: NO 0 /5    L. Ileococcyg: NO 0 /5    R. Obturator Int: NO 0 /5    L. Obturator Int: NO 0 /5    R. Coccygeus: NO 0 /5    L. Coccygeus: NO 0 /5      Pelvic floor contractions: 0/5    Stress Test of BRIONNA: negative     Post Void Residual collected by straight catheterization: 20 cc  The patient was in lithotomy position and the urethra was cleansed to maintain sterility. A 14Fr catheter was used to drain the bladder in sterile fashion.         Assessment and Plan:   Vaginal prolapse  Assessment & Plan:  We discussed the epidemiology, pathogenesis and etiology of pelvic organ prolapse. We discussed the potential risk factors which include genetics and environmental factors, such as childbirth, aging, menopause, straining, and previous surgery. I offered her management options which included nothing, pessary, and surgery.      We discussed her options of correcting the prolapse through a vaginal approach and laparoscopic approach. We also discussed repairing the vaginal prolapse with mesh and the option to do this without mesh. She has decided she would like to have a Native tissue anterior and apical repair and cystoscopy.       We discussed the surgical technique involved in the prolapse repair. I described the anatomic principles to the surgery as well as the success rates of conventional and compensatory repairs.     There is a 20% chance of recurrence and 15%

## 2024-05-06 ENCOUNTER — ANESTHESIA EVENT (OUTPATIENT)
Dept: SURGERY | Age: 69
End: 2024-05-06
Payer: MEDICARE

## 2024-05-07 ENCOUNTER — ANESTHESIA (OUTPATIENT)
Dept: SURGERY | Age: 69
End: 2024-05-07
Payer: MEDICARE

## 2024-05-07 ENCOUNTER — HOSPITAL ENCOUNTER (OUTPATIENT)
Age: 69
Setting detail: OUTPATIENT SURGERY
Discharge: HOME | End: 2024-05-07
Attending: OBSTETRICS & GYNECOLOGY | Admitting: OBSTETRICS & GYNECOLOGY
Payer: MEDICARE

## 2024-05-07 VITALS
DIASTOLIC BLOOD PRESSURE: 66 MMHG | OXYGEN SATURATION: 97 % | HEIGHT: 65 IN | WEIGHT: 142.1 LBS | SYSTOLIC BLOOD PRESSURE: 135 MMHG | HEART RATE: 80 BPM | TEMPERATURE: 98.1 F | BODY MASS INDEX: 23.68 KG/M2 | RESPIRATION RATE: 16 BRPM

## 2024-05-07 DIAGNOSIS — G89.18 POST-OP PAIN: Primary | ICD-10-CM

## 2024-05-07 LAB
GLUCOSE BLD STRIP.AUTO-MCNC: 150 MG/DL (ref 65–100)
SERVICE CMNT-IMP: ABNORMAL

## 2024-05-07 PROCEDURE — 7100000011 HC PHASE II RECOVERY - ADDTL 15 MIN: Performed by: OBSTETRICS & GYNECOLOGY

## 2024-05-07 PROCEDURE — 3600000003 HC SURGERY LEVEL 3 BASE: Performed by: OBSTETRICS & GYNECOLOGY

## 2024-05-07 PROCEDURE — 3700000000 HC ANESTHESIA ATTENDED CARE: Performed by: OBSTETRICS & GYNECOLOGY

## 2024-05-07 PROCEDURE — 57282 COLPOPEXY EXTRAPERITONEAL: CPT | Performed by: OBSTETRICS & GYNECOLOGY

## 2024-05-07 PROCEDURE — 3700000001 HC ADD 15 MINUTES (ANESTHESIA): Performed by: OBSTETRICS & GYNECOLOGY

## 2024-05-07 PROCEDURE — 2580000003 HC RX 258: Performed by: OBSTETRICS & GYNECOLOGY

## 2024-05-07 PROCEDURE — 3600000013 HC SURGERY LEVEL 3 ADDTL 15MIN: Performed by: OBSTETRICS & GYNECOLOGY

## 2024-05-07 PROCEDURE — 6370000000 HC RX 637 (ALT 250 FOR IP): Performed by: OBSTETRICS & GYNECOLOGY

## 2024-05-07 PROCEDURE — 6360000002 HC RX W HCPCS: Performed by: NURSE ANESTHETIST, CERTIFIED REGISTERED

## 2024-05-07 PROCEDURE — 2580000003 HC RX 258: Performed by: ANESTHESIOLOGY

## 2024-05-07 PROCEDURE — 7100000000 HC PACU RECOVERY - FIRST 15 MIN: Performed by: OBSTETRICS & GYNECOLOGY

## 2024-05-07 PROCEDURE — 7100000010 HC PHASE II RECOVERY - FIRST 15 MIN: Performed by: OBSTETRICS & GYNECOLOGY

## 2024-05-07 PROCEDURE — 6370000000 HC RX 637 (ALT 250 FOR IP): Performed by: ANESTHESIOLOGY

## 2024-05-07 PROCEDURE — 57285 REPAIR PARAVAG DEFECT VAG: CPT | Performed by: OBSTETRICS & GYNECOLOGY

## 2024-05-07 PROCEDURE — 6360000002 HC RX W HCPCS: Performed by: OBSTETRICS & GYNECOLOGY

## 2024-05-07 PROCEDURE — 2500000003 HC RX 250 WO HCPCS: Performed by: NURSE ANESTHETIST, CERTIFIED REGISTERED

## 2024-05-07 PROCEDURE — 82962 GLUCOSE BLOOD TEST: CPT

## 2024-05-07 PROCEDURE — 2500000003 HC RX 250 WO HCPCS: Performed by: OBSTETRICS & GYNECOLOGY

## 2024-05-07 PROCEDURE — 7100000001 HC PACU RECOVERY - ADDTL 15 MIN: Performed by: OBSTETRICS & GYNECOLOGY

## 2024-05-07 PROCEDURE — 2709999900 HC NON-CHARGEABLE SUPPLY: Performed by: OBSTETRICS & GYNECOLOGY

## 2024-05-07 RX ORDER — SODIUM CHLORIDE 0.9 % (FLUSH) 0.9 %
5-40 SYRINGE (ML) INJECTION EVERY 12 HOURS SCHEDULED
Status: DISCONTINUED | OUTPATIENT
Start: 2024-05-07 | End: 2024-05-07 | Stop reason: HOSPADM

## 2024-05-07 RX ORDER — ASPIRIN 81 MG/1
81 TABLET, CHEWABLE ORAL ONCE
Status: COMPLETED | OUTPATIENT
Start: 2024-05-07 | End: 2024-05-07

## 2024-05-07 RX ORDER — POLYETHYLENE GLYCOL 3350 17 G/17G
17 POWDER, FOR SOLUTION ORAL DAILY
Qty: 578 G | Refills: 0 | Status: SHIPPED | OUTPATIENT
Start: 2024-05-07 | End: 2024-06-06

## 2024-05-07 RX ORDER — MIDAZOLAM HYDROCHLORIDE 1 MG/ML
INJECTION INTRAMUSCULAR; INTRAVENOUS PRN
Status: DISCONTINUED | OUTPATIENT
Start: 2024-05-07 | End: 2024-05-07 | Stop reason: SDUPTHER

## 2024-05-07 RX ORDER — SODIUM CHLORIDE 9 MG/ML
INJECTION, SOLUTION INTRAVENOUS PRN
Status: DISCONTINUED | OUTPATIENT
Start: 2024-05-07 | End: 2024-05-07 | Stop reason: HOSPADM

## 2024-05-07 RX ORDER — LIDOCAINE HYDROCHLORIDE 20 MG/ML
INJECTION, SOLUTION EPIDURAL; INFILTRATION; INTRACAUDAL; PERINEURAL PRN
Status: DISCONTINUED | OUTPATIENT
Start: 2024-05-07 | End: 2024-05-07 | Stop reason: SDUPTHER

## 2024-05-07 RX ORDER — SODIUM CHLORIDE 0.9 % (FLUSH) 0.9 %
5-40 SYRINGE (ML) INJECTION PRN
Status: DISCONTINUED | OUTPATIENT
Start: 2024-05-07 | End: 2024-05-07 | Stop reason: SDUPTHER

## 2024-05-07 RX ORDER — PHENAZOPYRIDINE HYDROCHLORIDE 95 MG/1
95 TABLET ORAL ONCE
Status: COMPLETED | OUTPATIENT
Start: 2024-05-07 | End: 2024-05-07

## 2024-05-07 RX ORDER — CEFAZOLIN 1 G/1
INJECTION, POWDER, FOR SOLUTION INTRAVENOUS PRN
Status: DISCONTINUED | OUTPATIENT
Start: 2024-05-07 | End: 2024-05-07 | Stop reason: SDUPTHER

## 2024-05-07 RX ORDER — SODIUM CHLORIDE, SODIUM LACTATE, POTASSIUM CHLORIDE, CALCIUM CHLORIDE 600; 310; 30; 20 MG/100ML; MG/100ML; MG/100ML; MG/100ML
INJECTION, SOLUTION INTRAVENOUS CONTINUOUS
Status: DISCONTINUED | OUTPATIENT
Start: 2024-05-07 | End: 2024-05-07 | Stop reason: HOSPADM

## 2024-05-07 RX ORDER — ONDANSETRON 4 MG/1
4 TABLET, ORALLY DISINTEGRATING ORAL 3 TIMES DAILY PRN
Qty: 10 TABLET | Refills: 0 | Status: SHIPPED | OUTPATIENT
Start: 2024-05-07

## 2024-05-07 RX ORDER — DOCUSATE SODIUM 100 MG/1
100 CAPSULE, LIQUID FILLED ORAL 2 TIMES DAILY
Qty: 60 CAPSULE | Refills: 0 | Status: SHIPPED | OUTPATIENT
Start: 2024-05-07 | End: 2024-06-06

## 2024-05-07 RX ORDER — LIDOCAINE HYDROCHLORIDE AND EPINEPHRINE 10; 10 MG/ML; UG/ML
INJECTION, SOLUTION INFILTRATION; PERINEURAL PRN
Status: DISCONTINUED | OUTPATIENT
Start: 2024-05-07 | End: 2024-05-07 | Stop reason: ALTCHOICE

## 2024-05-07 RX ORDER — NALOXONE HYDROCHLORIDE 0.4 MG/ML
INJECTION, SOLUTION INTRAMUSCULAR; INTRAVENOUS; SUBCUTANEOUS PRN
Status: DISCONTINUED | OUTPATIENT
Start: 2024-05-07 | End: 2024-05-07 | Stop reason: HOSPADM

## 2024-05-07 RX ORDER — SODIUM CHLORIDE 0.9 % (FLUSH) 0.9 %
5-40 SYRINGE (ML) INJECTION EVERY 12 HOURS SCHEDULED
Status: DISCONTINUED | OUTPATIENT
Start: 2024-05-07 | End: 2024-05-07 | Stop reason: SDUPTHER

## 2024-05-07 RX ORDER — OXYCODONE HYDROCHLORIDE 5 MG/1
5 TABLET ORAL EVERY 6 HOURS PRN
Qty: 10 TABLET | Refills: 0 | Status: SHIPPED | OUTPATIENT
Start: 2024-05-07 | End: 2024-05-10

## 2024-05-07 RX ORDER — MIDAZOLAM HYDROCHLORIDE 2 MG/2ML
2 INJECTION, SOLUTION INTRAMUSCULAR; INTRAVENOUS
Status: DISCONTINUED | OUTPATIENT
Start: 2024-05-07 | End: 2024-05-07 | Stop reason: HOSPADM

## 2024-05-07 RX ORDER — SODIUM CHLORIDE 0.9 % (FLUSH) 0.9 %
5-40 SYRINGE (ML) INJECTION PRN
Status: DISCONTINUED | OUTPATIENT
Start: 2024-05-07 | End: 2024-05-07 | Stop reason: HOSPADM

## 2024-05-07 RX ORDER — SODIUM CHLORIDE 9 MG/ML
INJECTION, SOLUTION INTRAVENOUS PRN
Status: DISCONTINUED | OUTPATIENT
Start: 2024-05-07 | End: 2024-05-07 | Stop reason: SDUPTHER

## 2024-05-07 RX ORDER — FENTANYL CITRATE 50 UG/ML
INJECTION, SOLUTION INTRAMUSCULAR; INTRAVENOUS PRN
Status: DISCONTINUED | OUTPATIENT
Start: 2024-05-07 | End: 2024-05-07 | Stop reason: SDUPTHER

## 2024-05-07 RX ORDER — HYDROMORPHONE HYDROCHLORIDE 2 MG/ML
0.5 INJECTION, SOLUTION INTRAMUSCULAR; INTRAVENOUS; SUBCUTANEOUS EVERY 5 MIN PRN
Status: DISCONTINUED | OUTPATIENT
Start: 2024-05-07 | End: 2024-05-07 | Stop reason: HOSPADM

## 2024-05-07 RX ORDER — EPHEDRINE SULFATE 5 MG/ML
INJECTION INTRAVENOUS PRN
Status: DISCONTINUED | OUTPATIENT
Start: 2024-05-07 | End: 2024-05-07 | Stop reason: SDUPTHER

## 2024-05-07 RX ORDER — ONDANSETRON 2 MG/ML
INJECTION INTRAMUSCULAR; INTRAVENOUS PRN
Status: DISCONTINUED | OUTPATIENT
Start: 2024-05-07 | End: 2024-05-07 | Stop reason: SDUPTHER

## 2024-05-07 RX ORDER — DEXAMETHASONE SODIUM PHOSPHATE 10 MG/ML
INJECTION INTRAMUSCULAR; INTRAVENOUS PRN
Status: DISCONTINUED | OUTPATIENT
Start: 2024-05-07 | End: 2024-05-07 | Stop reason: SDUPTHER

## 2024-05-07 RX ORDER — PROCHLORPERAZINE EDISYLATE 5 MG/ML
5 INJECTION INTRAMUSCULAR; INTRAVENOUS
Status: DISCONTINUED | OUTPATIENT
Start: 2024-05-07 | End: 2024-05-07 | Stop reason: HOSPADM

## 2024-05-07 RX ORDER — ONDANSETRON 2 MG/ML
4 INJECTION INTRAMUSCULAR; INTRAVENOUS
Status: DISCONTINUED | OUTPATIENT
Start: 2024-05-07 | End: 2024-05-07 | Stop reason: HOSPADM

## 2024-05-07 RX ORDER — LIDOCAINE HYDROCHLORIDE 10 MG/ML
1 INJECTION, SOLUTION INFILTRATION; PERINEURAL
Status: DISCONTINUED | OUTPATIENT
Start: 2024-05-07 | End: 2024-05-07 | Stop reason: HOSPADM

## 2024-05-07 RX ORDER — ACETAMINOPHEN 500 MG
1000 TABLET ORAL ONCE
Status: COMPLETED | OUTPATIENT
Start: 2024-05-07 | End: 2024-05-07

## 2024-05-07 RX ORDER — DIPHENHYDRAMINE HYDROCHLORIDE 50 MG/ML
12.5 INJECTION INTRAMUSCULAR; INTRAVENOUS
Status: DISCONTINUED | OUTPATIENT
Start: 2024-05-07 | End: 2024-05-07 | Stop reason: HOSPADM

## 2024-05-07 RX ORDER — PROPOFOL 10 MG/ML
INJECTION, EMULSION INTRAVENOUS PRN
Status: DISCONTINUED | OUTPATIENT
Start: 2024-05-07 | End: 2024-05-07 | Stop reason: SDUPTHER

## 2024-05-07 RX ADMIN — ONDANSETRON 4 MG: 2 INJECTION INTRAMUSCULAR; INTRAVENOUS at 09:18

## 2024-05-07 RX ADMIN — EPHEDRINE SULFATE 15 MG: 5 INJECTION INTRAVENOUS at 09:10

## 2024-05-07 RX ADMIN — ACETAMINOPHEN 1000 MG: 500 TABLET, FILM COATED ORAL at 08:26

## 2024-05-07 RX ADMIN — SODIUM CHLORIDE, SODIUM LACTATE, POTASSIUM CHLORIDE, AND CALCIUM CHLORIDE: 600; 310; 30; 20 INJECTION, SOLUTION INTRAVENOUS at 08:48

## 2024-05-07 RX ADMIN — LIDOCAINE HYDROCHLORIDE 100 MG: 20 INJECTION, SOLUTION EPIDURAL; INFILTRATION; INTRACAUDAL; PERINEURAL at 09:05

## 2024-05-07 RX ADMIN — CEFAZOLIN 2000 MG: 10 INJECTION, POWDER, FOR SOLUTION INTRAVENOUS at 08:34

## 2024-05-07 RX ADMIN — DEXAMETHASONE SODIUM PHOSPHATE 5 MG: 10 INJECTION INTRAMUSCULAR; INTRAVENOUS at 09:18

## 2024-05-07 RX ADMIN — SODIUM CHLORIDE, SODIUM LACTATE, POTASSIUM CHLORIDE, AND CALCIUM CHLORIDE: 600; 310; 30; 20 INJECTION, SOLUTION INTRAVENOUS at 09:30

## 2024-05-07 RX ADMIN — EPHEDRINE SULFATE 10 MG: 5 INJECTION INTRAVENOUS at 09:46

## 2024-05-07 RX ADMIN — PROPOFOL 120 MG: 10 INJECTION, EMULSION INTRAVENOUS at 09:05

## 2024-05-07 RX ADMIN — FENTANYL CITRATE 50 MCG: 50 INJECTION, SOLUTION INTRAMUSCULAR; INTRAVENOUS at 09:05

## 2024-05-07 RX ADMIN — EPHEDRINE SULFATE 10 MG: 5 INJECTION INTRAVENOUS at 09:43

## 2024-05-07 RX ADMIN — ASPIRIN 81 MG: 81 TABLET, CHEWABLE ORAL at 08:54

## 2024-05-07 RX ADMIN — URINARY PAIN RELIEF 95 MG: 95 TABLET ORAL at 08:26

## 2024-05-07 RX ADMIN — EPHEDRINE SULFATE 10 MG: 5 INJECTION INTRAVENOUS at 09:27

## 2024-05-07 RX ADMIN — FENTANYL CITRATE 50 MCG: 50 INJECTION, SOLUTION INTRAMUSCULAR; INTRAVENOUS at 09:25

## 2024-05-07 RX ADMIN — MIDAZOLAM 2 MG: 1 INJECTION INTRAMUSCULAR; INTRAVENOUS at 08:54

## 2024-05-07 RX ADMIN — CEFAZOLIN 2000 MG: 1 INJECTION, POWDER, FOR SOLUTION INTRAVENOUS at 09:12

## 2024-05-07 ASSESSMENT — PAIN SCALES - GENERAL: PAINLEVEL_OUTOF10: 2

## 2024-05-07 ASSESSMENT — ENCOUNTER SYMPTOMS: SHORTNESS OF BREATH: 1

## 2024-05-07 NOTE — PERIOP NOTE
Patient verified name and .  Order for consent not found in EHR     Type 1b surgery, PAT phone assessment complete.  Orders not received.  Labs per surgeon: None  Labs per anesthesia protocol: K+        Patient answered medical/surgical history questions at their best of ability. All prior to admission medications documented in EPIC.    Patient instructed to continue taking all prescription medications up to the day of surgery but to take only the following medications the day of surgery according to anesthesia guidelines with a small sip of water: Carvedilol, Omeprazole, Entresto and Tramadol (if needed).  Also, patient is requested to take 2 Tylenol in the morning and then again before bed on the day before surgery. Regular or extra strength may be used.       Patient informed that all vitamins and supplements should be held 7 days prior to surgery and NSAIDS 5 days prior to surgery. Prescription meds to hold:ASA per surgeon's instruction    Patient instructed on the following:    > Arrive at A Entrance, time of arrival to be called the day before by 1700  > NPO after midnight, unless otherwise indicated, including gum, mints, and ice chips  > Responsible adult must drive patient to the hospital, stay during surgery, and patient will need supervision 24 hours after anesthesia  > Use non moisturizing soap in shower the night before surgery and on the morning of surgery  > All piercings must be removed prior to arrival.    > Leave all valuables (money and jewelry) at home but bring insurance card and ID on DOS.   > You may be required to pay a deductible or co-pay on the day of your procedure. You can pre-pay by calling [phone] if your surgery is at the [facility] or [phone] if your surgery is at the [facility].  > Do not wear make-up, nail polish, lotions, cologne, perfumes, powders, or oil on skin. Artificial nails are not permitted.    
  The following records have been requested from : [facility]      Attn Medical Records:    Please send latest lab work results via fax to [phone].  Pt having surgery and need most recent results  Liudmila Escoto   1955    Thank you!   
 The lakhani bag was removed from the indwelling catheter and 300 cc of sterile water was instilled into the bladder at 1155. The lakhani catheter was then removed at 1156. The patient was asked to void. She was able to void 200 cc at  1237 pm.       Pt VSS stable. Pain and Nausea controlled at this time. Verbal sign out per MD Wagner when pacu care is completed. Plan of care continues.       The patient was discharged without a lakhani catheter.     
Discharge instructions reviewed with pt and family member who verbalize understanding of follow up care.   
negative

## 2024-05-07 NOTE — ANESTHESIA PRE PROCEDURE
(If Applicable):  No results found for: \"LABABO\"    Drug/Infectious Status (If Applicable):  No results found for: \"HIV\", \"HEPCAB\"    COVID-19 Screening (If Applicable): No results found for: \"COVID19\"        Anesthesia Evaluation  Patient summary reviewed   no history of anesthetic complications:   Airway: Mallampati: II  TM distance: >3 FB   Neck ROM: full  Mouth opening: > = 3 FB   Dental: normal exam         Pulmonary: breath sounds clear to auscultation  (+)   shortness of breath:             (-) COPD and sleep apnea                           Cardiovascular:  Exercise tolerance: good (>4 METS)  (+) hypertension:, CABG/stent (stent 2018; will give preop, has not taken for 5 days, stopped on her own):, CHF:        Rhythm: regular  Rate: normal           Beta Blocker:  Dose within 24 Hrs      ROS comment:   PCI to Ramus 2018  Saw Dr Sanders 1/24, no new treatment, felt to be optimized with improvement in EF  A fib noted on his office visit, but patient denies.    ·  Left Ventricle: Low normal left ventricular systolic function with a visually estimated EF of 50 - 55%. Left ventricle size is normal. Normal wall thickness. Normal wall motion. Abnormal diastolic function.  ·  Mitral Valve: Mild to moderate regurgitation.  ·  Left Atrium: Left atrium is mildly dilated.  ·  Right Atrium: Right atrium is mildly dilated.       Neuro/Psych:   Negative Neuro/Psych ROS              GI/Hepatic/Renal:   (+) GERD: well controlled          Endo/Other:    (+) Diabetes ()Type II DM.                 Abdominal:             Vascular:     - DVT and PE.      Other Findings:             Anesthesia Plan      general     ASA 3     (Will give bASA preop)  Induction: intravenous.    MIPS: Postoperative opioids intended and Prophylactic antiemetics administered.  Anesthetic plan and risks discussed with patient.    Use of blood products discussed with patient whom consented to blood products.    Plan discussed with

## 2024-05-07 NOTE — ANESTHESIA PROCEDURE NOTES
Airway  Date/Time: 5/7/2024 9:07 AM  Urgency: elective    Airway not difficult    General Information and Staff    Patient location during procedure: OR  Performed: anesthesiologist   Performed by: Yumiko Perez APRN - CRNA  Authorized by: Wendy Rodriguez MD      Indications and Patient Condition  Indications for airway management: anesthesia  Spontaneous Ventilation: absent  Sedation level: deep  Preoxygenated: yes  Patient position: sniffing  MILS not maintained throughout  Mask difficulty assessment: not attempted    Final Airway Details  Final airway type: supraglottic airway      Successful airway: oropharyngeal  Size 3     Number of attempts at approach: 1    no

## 2024-05-07 NOTE — ANESTHESIA POSTPROCEDURE EVALUATION
Department of Anesthesiology  Postprocedure Note    Patient: Liudmila Escoto  MRN: [id number]  YOB: 1955  Date of evaluation: 5/7/2024    Procedure Summary       Date: 05/07/24 Room / Location: [facility] MAIN OR  / [facility] MAIN OR    Anesthesia Start: 0846 Anesthesia Stop: 1016    Procedures:       Sacrospinous Ligament Suspension (Pelvis)      VAGINAL REPAIR (Pelvis) Diagnosis:       Uterovaginal prolapse, complete      (Uterovaginal prolapse, complete [N81.3])    Surgeons: Joana Wagner DO Responsible Provider: Wendy Rodriguez MD    Anesthesia Type: General ASA Status: 3            Anesthesia Type: General    Alicia Phase I: Alicia Score: 10    Alicia Phase II:      Anesthesia Post Evaluation    Patient location during evaluation: PACU  Patient participation: complete - patient participated  Level of consciousness: awake and alert  Airway patency: patent  Nausea & Vomiting: no nausea  Cardiovascular status: hemodynamically stable  Respiratory status: acceptable  Hydration status: euvolemic  Comments: Blood pressure 138/68, pulse 84, temperature 98.1 °F (36.7 °C), temperature source Temporal, resp. rate 17, height 1.651 m (5' 5\"), weight 64.5 kg (142 lb 1.6 oz), SpO2 96 %.   Pain management: adequate and satisfactory to patient        No notable events documented.

## 2024-05-07 NOTE — OP NOTE
NAMES OF PROCEDURES:   1. Bilateral vaginal and paravaginal repairs   2. Bilateral sacrospinous ligament fixation   3. Cystoscopy     PREOPERATIVE DIAGNOSES: Anterior Wall Prolapse, Vaginal Vault Prolapse  POSTOPERATIVE DIAGNOSIS:Anterior Wall Prolapse, Vaginal Vault Prolapse  Wendy Rodriguez MD    EBL: 10cc    Urine Output: 500cc    Specimen: None    FINDINGS: There was spill noted from both ureteral orifices and normal-appearing urine. No evidence of any damage to the bladder or urethra. No stones, masses or ulcerations in the bladder or urethra. Normal rectal exam.    INDICATIONS FOR PROCEDURE: This is a 68 year-old female with a history of worsening symptomatic pelvic organ prolapse and  stress urinary incontinence who desired definitive surgical treatment after being extensively counseled on the risks, benefits, indications and alternatives of the procedure. Risks reviewed include bleeding, infection, damage to the bladder, ureters, urethra, bowel, blood vessels, nerves, postoperative urinary retention, postoperative incontinence, pelvic pain, dyspareunia, recurrence, mesh erosion, anesthetic complications and death. The patient expressed understanding and informed consent was obtained.    DESCRIPTION OF PROCEDURE: The patient was brought to the operating room, where she was placed in the supine position. Once in the supine position, she was placed under general anesthesia with an endotracheal tube. She was then placed in the dorsal lithotomy position with the Yellofins stirrups, making sure that her ankles, knees and hips were in alignment toward the contralateral shoulders. She was prepped and draped in normal sterile fashion. A 16-Zambian Tate catheter was placed in the patient's bladder. [place] Star retractor was placed on the patient's pelvis, with hooks along the vaginal epithelium. Two Allis clamps were placed along the anterior wall of the vagina, one at the UVJ, and the other at the apex of the

## 2024-05-08 NOTE — FLOWSHEET NOTE
Patient states she did not sleep well last night. States her pain is controlled. Noticed odor when urinating yesterday-resolved. No N/V/Fevers. Concerns for [id number] patient to follow up with PCP. Questions regarding insurance-told patient she needed to contact the billing department.

## 2024-05-09 ENCOUNTER — TELEPHONE (OUTPATIENT)
Dept: UROGYNECOLOGY | Age: 69
End: 2024-05-09

## 2024-05-09 NOTE — TELEPHONE ENCOUNTER
Patient was notified, she mentioned that she took a benadryl at noon and some of the redness and swelling went down,she was also told to get up and move around and drink plenty of water per Dr Wagner.

## 2024-05-09 NOTE — TELEPHONE ENCOUNTER
Pt had surgery Bilateral vaginal and paravaginal repairs Bilateral sacrospinous ligament fixation   Cystoscopy on 05/07/2024    Pt is calling today complaining of redness and swelling bilateral feet and ankles. Patient denies fever, chills, nausea, vomiting, chest pain or shortness of breath. MsBradley Liudmila ESPOSITO KieraEdgard state that the duration of the problem is since last night Patient was awake and alert. Patient complains of  redness and swelling over the body mostly feet and ankles.     She never  the Oxycodone which she is allergic too.   Only taking Tylenol for pain control.    Please advise.

## 2024-05-10 ENCOUNTER — TELEPHONE (OUTPATIENT)
Dept: UROGYNECOLOGY | Age: 69
End: 2024-05-10

## 2024-05-10 NOTE — TELEPHONE ENCOUNTER
I called Liudmila Escoto to see how she has been doing since her surgery. She denies fevers, chills, nausea, vomiting, chest pain, and shortness of breath. She is tolerating a regular diet. Her pain is well controlled on Tylenol and advil. She is ambulating. She has had a bowel movement. She has been taking colace and miralax as instructed.   All of the swelling in her ankles has gone away.     All of her questions and concerns were addressed. We will follow up at her post-operative appointment.

## 2024-05-24 ENCOUNTER — TELEPHONE (OUTPATIENT)
Dept: UROGYNECOLOGY | Age: 69
End: 2024-05-24

## 2024-05-24 NOTE — TELEPHONE ENCOUNTER
Pt had surgery on Bilateral vaginal and paravaginal repairs  Bilateral sacrospinous ligament fixation Cystoscopy on 05/07/2024.    Pt is calling today complaining of vagina bleeding ( only using the panty liner x2) she is verbally said that she is not saturated though a panty liner. Patient denies fever, chills, nausea, vomiting, chest pain or shortness of breath. Ms. Liudmila ESPOSITO TYRONEKiki state that the duration of the problem is off and on 24 hours . Patient denies redness.  She is currently denying any new pains.  Pain is 2/10. Patient is moving around a lot more.. Patient has tried Treatments; pain medications: Advil twice a day.     Patient is urinating and having bowel movements normal     15 mins was spent going over the discharge paperwork. Patient understood if anything changes she is go to the nearest ER.     Please advise.

## 2024-06-06 ENCOUNTER — OFFICE VISIT (OUTPATIENT)
Dept: UROGYNECOLOGY | Age: 69
End: 2024-06-06

## 2024-06-06 DIAGNOSIS — N81.3 UTEROVAGINAL PROLAPSE, COMPLETE: Primary | ICD-10-CM

## 2024-06-06 PROCEDURE — 99024 POSTOP FOLLOW-UP VISIT: CPT | Performed by: OBSTETRICS & GYNECOLOGY

## 2024-06-06 RX ORDER — ESTRADIOL 0.1 MG/G
1 CREAM VAGINAL
Qty: 42.5 G | Refills: 3 | Status: SHIPPED | OUTPATIENT
Start: 2024-06-07

## 2024-06-06 NOTE — ASSESSMENT & PLAN NOTE
For another 4 weeks:  No heavy lifting   No bathing/ soaking. No hot tubs etc. You may shower.  Nothing in the vagina (sex, tampons, douching)    You may slowly start to resume your normal activity.    Please use vaginal estrogen 2-3 times per weeks    You have sutures in the vagina that may start to come out (this is normal) they look like white thread.

## 2024-06-06 NOTE — PROGRESS NOTES
-2   Posterior Wall (Bp) -2 -2   Posterior Fornix (D) -6 -7            1. Uterovaginal prolapse, complete  Assessment & Plan:  For another 4 weeks:  No heavy lifting   No bathing/ soaking. No hot tubs etc. You may shower.  Nothing in the vagina (sex, tampons, douching)    You may slowly start to resume your normal activity.    Please use vaginal estrogen 2-3 times per weeks    You have sutures in the vagina that may start to come out (this is normal) they look like white thread.           No follow-up provider specified.          Joana Wagner DO

## 2024-06-06 NOTE — PATIENT INSTRUCTIONS
We discussed starting vaginal estrogen cream for atrophy. I described the benefits to vaginal health. We briefly discussed well publicized literature on risks of hormone replacement therapy. I described the low systemic absorption of vaginal estrogen. She will start vaginal estrogen. Please use 1g in the vagina 2-3 nights per week.     For another 4 weeks:  No heavy lifting   No bathing/ soaking. No hot tubs etc. You may shower.  Nothing in the vagina (sex, tampons, douching)    You may slowly start to resume your normal activity.    Please use vaginal estrogen 2-3 times per weeks    You have sutures in the vagina that may start to come out (this is normal) they look like white thread.

## 2024-06-11 RX ORDER — NITROGLYCERIN 0.4 MG/1
0.4 TABLET SUBLINGUAL EVERY 5 MIN PRN
Qty: 25 TABLET | Refills: 3 | Status: SHIPPED | OUTPATIENT
Start: 2024-06-11

## 2024-06-11 NOTE — TELEPHONE ENCOUNTER
Requested Prescriptions     Pending Prescriptions Disp Refills    nitroGLYCERIN (NITROSTAT) 0.4 MG SL tablet 25 tablet 3     Sig: Place 1 tablet under the tongue every 5 minutes as needed for Chest pain       Verified rx. Last OV 01/16/24. Erx to pharm on file.

## 2024-07-07 NOTE — PROGRESS NOTES
[facility] CARDIOLOGY  [address] [place] [address], [address]  [place] [address]  PHONE: [phone]        NAME:  Liudmila Escoto  : 1955  MRN: [id number]     PCP:  Carlos Elizabeth MD      SUBJECTIVE:   Liudmila Escoto is a 68 y.o. female seen for a follow up visit regarding the following:     Chief Complaint   Patient presents with    Hypertension       HPI:    Previously cared for by Dr. Deshpande with known CAD s/p PCI Ramus in 2018 and multiple cardiac risk factors action fraction chronically mildly depressed at 40 to 45% but no recent heart failure symptoms whatsoever and medically maximized... Staying active without any significant limitations other than knee pain, s/p TKR with Dr. Sims without adverse cardiac event.       Echo 2022:    Left Ventricle: Left ventricle size is normal. Normal wall thickness. Normal wall motion. Mildly reduced left ventricular systolic function with a visually estimated EF of 45 - 50%. Grade I diastolic dysfunction with normal LAP.    Left Atrium: Left atrium is moderately dilated.    Mitral Valve: Mild transvalvular regurgitation.    Echo 2023:  Left Ventricle Low normal left ventricular systolic function with a visually estimated EF of 50 - 55%. Left ventricle size is normal. Normal wall thickness. Normal wall motion. Abnormal diastolic function.   Left Atrium Left atrium is mildly dilated.   Right Ventricle Right ventricle size is normal. Normal systolic function.   Right Atrium Right atrium is mildly dilated.   Aortic Valve Sclerosis of the aortic valve cusp. Trace regurgitation. No stenosis.   Mitral Valve Valve structure is normal. Mild to moderate regurgitation. No stenosis noted.   Tricuspid Valve Valve structure is normal. Mild regurgitation. No stenosis noted. The estimated RVSP is 31 mmHg.   Pulmonic Valve Valve structure is normal. Mild regurgitation. No stenosis noted.   Aorta Normal sized aortic root.   Pericardium No pericardial

## 2024-07-10 ENCOUNTER — OFFICE VISIT (OUTPATIENT)
Age: 69
End: 2024-07-10
Payer: MEDICARE

## 2024-07-10 VITALS
HEIGHT: 65 IN | HEART RATE: 72 BPM | DIASTOLIC BLOOD PRESSURE: 72 MMHG | WEIGHT: 145 LBS | SYSTOLIC BLOOD PRESSURE: 118 MMHG | BODY MASS INDEX: 24.16 KG/M2

## 2024-07-10 DIAGNOSIS — I50.22 CHRONIC SYSTOLIC CHF (CONGESTIVE HEART FAILURE) (HCC): Primary | ICD-10-CM

## 2024-07-10 DIAGNOSIS — I47.9 PAROXYSMAL TACHYCARDIA (HCC): ICD-10-CM

## 2024-07-10 DIAGNOSIS — I25.5 ISCHEMIC CARDIOMYOPATHY: ICD-10-CM

## 2024-07-10 DIAGNOSIS — I10 ESSENTIAL HYPERTENSION WITH GOAL BLOOD PRESSURE LESS THAN 130/85: ICD-10-CM

## 2024-07-10 DIAGNOSIS — Z98.61 S/P PTCA (PERCUTANEOUS TRANSLUMINAL CORONARY ANGIOPLASTY): ICD-10-CM

## 2024-07-10 DIAGNOSIS — E78.2 MIXED HYPERLIPIDEMIA: ICD-10-CM

## 2024-07-10 PROCEDURE — G8420 CALC BMI NORM PARAMETERS: HCPCS | Performed by: INTERNAL MEDICINE

## 2024-07-10 PROCEDURE — 1123F ACP DISCUSS/DSCN MKR DOCD: CPT | Performed by: INTERNAL MEDICINE

## 2024-07-10 PROCEDURE — G8400 PT W/DXA NO RESULTS DOC: HCPCS | Performed by: INTERNAL MEDICINE

## 2024-07-10 PROCEDURE — 1090F PRES/ABSN URINE INCON ASSESS: CPT | Performed by: INTERNAL MEDICINE

## 2024-07-10 PROCEDURE — 99214 OFFICE O/P EST MOD 30 MIN: CPT | Performed by: INTERNAL MEDICINE

## 2024-07-10 PROCEDURE — 3074F SYST BP LT 130 MM HG: CPT | Performed by: INTERNAL MEDICINE

## 2024-07-10 PROCEDURE — 1036F TOBACCO NON-USER: CPT | Performed by: INTERNAL MEDICINE

## 2024-07-10 PROCEDURE — 3078F DIAST BP <80 MM HG: CPT | Performed by: INTERNAL MEDICINE

## 2024-07-10 PROCEDURE — G8427 DOCREV CUR MEDS BY ELIG CLIN: HCPCS | Performed by: INTERNAL MEDICINE

## 2024-07-10 PROCEDURE — 3017F COLORECTAL CA SCREEN DOC REV: CPT | Performed by: INTERNAL MEDICINE

## 2024-07-10 RX ORDER — SACUBITRIL AND VALSARTAN 24; 26 MG/1; MG/1
1 TABLET, FILM COATED ORAL 2 TIMES DAILY
Qty: 60 TABLET | Refills: 11 | Status: SHIPPED | OUTPATIENT
Start: 2024-07-10

## 2024-08-02 ENCOUNTER — TELEPHONE (OUTPATIENT)
Age: 69
End: 2024-08-02

## 2024-08-05 NOTE — TELEPHONE ENCOUNTER
Spoke to patient and informed her that we are waiting to hear back from the assistance on approval.

## 2024-08-07 ENCOUNTER — OFFICE VISIT (OUTPATIENT)
Dept: UROGYNECOLOGY | Age: 69
End: 2024-08-07
Payer: MEDICARE

## 2024-08-07 DIAGNOSIS — N39.41 URGE INCONTINENCE: ICD-10-CM

## 2024-08-07 DIAGNOSIS — N81.3 UTEROVAGINAL PROLAPSE, COMPLETE: Primary | ICD-10-CM

## 2024-08-07 PROCEDURE — 1090F PRES/ABSN URINE INCON ASSESS: CPT | Performed by: OBSTETRICS & GYNECOLOGY

## 2024-08-07 PROCEDURE — 0509F URINE INCON PLAN DOCD: CPT | Performed by: OBSTETRICS & GYNECOLOGY

## 2024-08-07 PROCEDURE — G8420 CALC BMI NORM PARAMETERS: HCPCS | Performed by: OBSTETRICS & GYNECOLOGY

## 2024-08-07 PROCEDURE — 99213 OFFICE O/P EST LOW 20 MIN: CPT | Performed by: OBSTETRICS & GYNECOLOGY

## 2024-08-07 PROCEDURE — 1123F ACP DISCUSS/DSCN MKR DOCD: CPT | Performed by: OBSTETRICS & GYNECOLOGY

## 2024-08-07 PROCEDURE — 99459 PELVIC EXAMINATION: CPT | Performed by: OBSTETRICS & GYNECOLOGY

## 2024-08-07 PROCEDURE — G8400 PT W/DXA NO RESULTS DOC: HCPCS | Performed by: OBSTETRICS & GYNECOLOGY

## 2024-08-07 PROCEDURE — 1036F TOBACCO NON-USER: CPT | Performed by: OBSTETRICS & GYNECOLOGY

## 2024-08-07 PROCEDURE — 3017F COLORECTAL CA SCREEN DOC REV: CPT | Performed by: OBSTETRICS & GYNECOLOGY

## 2024-08-07 PROCEDURE — G8428 CUR MEDS NOT DOCUMENT: HCPCS | Performed by: OBSTETRICS & GYNECOLOGY

## 2024-08-07 NOTE — PROGRESS NOTES
[facility]YNECOLOG[facility]  [address]  [address]  [facility] [address]  Dept: [phone]        PCP:  Carlos Elizabeth MD    8/7/2024    Chief Complaint   Patient presents with    Follow-up     3 month post op.   She had a Bilateral vaginal and paravaginal repairs, Bilateral sacrospinous ligament fixation, Cystoscopy on  5/7/24.      HPI:  Liudmila Escoto is here for her postop check. She had a Bilateral vaginal and paravaginal repairs, Bilateral sacrospinous ligament fixation, Cystoscopy on  5/7/24.  She is doing very well today. She denies fevers, chills nausea, vomiting, chest pain, shortness of breath. She is ambulating, tolerating a regular diet, and pain is well controlled. She does not have any vaginal bleeding and is currently back to doing her normal activities of daily living. She does not have any difficulty with urination or bowel movements.     She does not have any signs or symptoms of POP or incontinence recurrence.    She is very happy with the outcome of her surgery.     No results found for this visit on 08/07/24.    There were no vitals taken for this visit.    Exam: This includes at least 4 minutes of clinical staff time associated with chaperoning a pelvic exam.  Incisions:  Clean, Dry, and Intact. Healing well.   Vulva:    Normal. No lesions  Bartholin's Gland:  Bilateral , Normal, nontender  Skenes Gland:  Bilateral, Normal, nontender   Clitoris:  Normal.   Introitus:    Normal.   Urethral Meatus:  Normal appearing, normal size, no lesions, no prolapse  Urethra:  No masses, no tenderness  Vagina:  No atrophy, no discharge, no lesions  Cervix:  No lesions, no discharge  Uterus:  No tenderness, normal mobility   Adnexa:   No masses palpated, no tenderness  Bladder:  No tenderness, no masses palpated  Perineum:  Normal, no lesions    Rectal   Anorectal Exam: No hemorrhoids and no masses or lesions of the perineum           8/7/2024     3:00 PM 4/3/2024     3:00 PM 11/1/2023

## 2024-08-07 NOTE — ASSESSMENT & PLAN NOTE
You are now 4 months postop:  You no longer have lifting, or bathing restrictions. You should be back to your normal activities of daily living.     I am releasing you back to your primary care provider. Should your symptoms recur, or new symptoms arise, please do not hesitate to call our office for an appointment.

## 2024-09-04 ENCOUNTER — TELEPHONE (OUTPATIENT)
Age: 69
End: 2024-09-04

## 2024-09-04 NOTE — TELEPHONE ENCOUNTER
----- Message from Dr. Brent Sanders MD sent at 9/4/2024  9:33 AM EDT -----  Outside labs reviewed.  Everything looks great.  Continue meds, healthy diet lifestyle and keep routine follow-up.

## 2024-11-04 RX ORDER — SPIRONOLACTONE 25 MG/1
12.5 TABLET ORAL DAILY
Qty: 45 TABLET | Refills: 3 | Status: SHIPPED | OUTPATIENT
Start: 2024-11-04

## 2024-11-04 RX ORDER — CARVEDILOL 6.25 MG/1
6.25 TABLET ORAL 2 TIMES DAILY WITH MEALS
Qty: 180 TABLET | Refills: 3 | Status: SHIPPED | OUTPATIENT
Start: 2024-11-04

## 2024-11-04 NOTE — TELEPHONE ENCOUNTER
Requested Prescriptions     Pending Prescriptions Disp Refills    carvedilol (COREG) 6.25 MG tablet [Pharmacy Med Name: Carvedilol Oral Tablet 6.25 MG] 180 tablet 3     Sig: TAKE 1 TABLET TWICE DAILY WITH MEALS    spironolactone (ALDACTONE) 25 MG tablet [Pharmacy Med Name: Spironolactone Oral Tablet 25 MG] 45 tablet 3     Sig: TAKE 1/2 TABLET EVERY DAY     Verified rx. Last OV 07/10/24. Erx to pharm on file.

## 2024-11-17 ENCOUNTER — APPOINTMENT (OUTPATIENT)
Dept: CT IMAGING | Age: 69
End: 2024-11-17
Payer: MEDICARE

## 2024-11-17 ENCOUNTER — APPOINTMENT (OUTPATIENT)
Dept: GENERAL RADIOLOGY | Age: 69
End: 2024-11-17
Payer: MEDICARE

## 2024-11-17 ENCOUNTER — HOSPITAL ENCOUNTER (EMERGENCY)
Age: 69
Discharge: HOME | End: 2024-11-17
Payer: MEDICARE

## 2024-11-17 VITALS
WEIGHT: 140 LBS | HEART RATE: 108 BPM | SYSTOLIC BLOOD PRESSURE: 121 MMHG | BODY MASS INDEX: 23.32 KG/M2 | RESPIRATION RATE: 23 BRPM | DIASTOLIC BLOOD PRESSURE: 63 MMHG | TEMPERATURE: 97.1 F | HEIGHT: 65 IN | OXYGEN SATURATION: 97 %

## 2024-11-17 DIAGNOSIS — R07.9 CHEST PAIN, UNSPECIFIED TYPE: Primary | ICD-10-CM

## 2024-11-17 LAB
ALBUMIN SERPL-MCNC: 4 G/DL (ref 3.2–4.6)
ALBUMIN/GLOB SERPL: 1.2 (ref 1–1.9)
ALP SERPL-CCNC: 56 U/L (ref 35–104)
ALT SERPL-CCNC: 13 U/L (ref 8–45)
ANION GAP SERPL CALC-SCNC: 13 MMOL/L (ref 7–16)
AST SERPL-CCNC: 26 U/L (ref 15–37)
BASOPHILS # BLD: 0.1 K/UL (ref 0–0.2)
BASOPHILS NFR BLD: 1 % (ref 0–2)
BILIRUB SERPL-MCNC: 0.3 MG/DL (ref 0–1.2)
BUN SERPL-MCNC: 19 MG/DL (ref 8–23)
CALCIUM SERPL-MCNC: 9.7 MG/DL (ref 8.8–10.2)
CHLORIDE SERPL-SCNC: 105 MMOL/L (ref 98–107)
CO2 SERPL-SCNC: 21 MMOL/L (ref 20–29)
CREAT SERPL-MCNC: 0.97 MG/DL (ref 0.6–1.1)
D DIMER PPP FEU-MCNC: 0.64 UG/ML(FEU)
DIFFERENTIAL METHOD BLD: ABNORMAL
EOSINOPHIL # BLD: 0.2 K/UL (ref 0–0.8)
EOSINOPHIL NFR BLD: 4 % (ref 0.5–7.8)
ERYTHROCYTE [DISTWIDTH] IN BLOOD BY AUTOMATED COUNT: 12.8 % (ref 11.9–14.6)
GLOBULIN SER CALC-MCNC: 3.2 G/DL (ref 2.3–3.5)
GLUCOSE BLD STRIP.AUTO-MCNC: 126 MG/DL (ref 65–100)
GLUCOSE SERPL-MCNC: 160 MG/DL (ref 70–99)
HCT VFR BLD AUTO: 34.4 % (ref 35.8–46.3)
HGB BLD-MCNC: 11.2 G/DL (ref 11.7–15.4)
IMM GRANULOCYTES # BLD AUTO: 0 K/UL (ref 0–0.5)
IMM GRANULOCYTES NFR BLD AUTO: 1 % (ref 0–5)
LYMPHOCYTES # BLD: 2 K/UL (ref 0.5–4.6)
LYMPHOCYTES NFR BLD: 32 % (ref 13–44)
MCH RBC QN AUTO: 28.2 PG (ref 26.1–32.9)
MCHC RBC AUTO-ENTMCNC: 32.6 G/DL (ref 31.4–35)
MCV RBC AUTO: 86.6 FL (ref 82–102)
MONOCYTES # BLD: 0.4 K/UL (ref 0.1–1.3)
MONOCYTES NFR BLD: 6 % (ref 4–12)
NEUTS SEG # BLD: 3.5 K/UL (ref 1.7–8.2)
NEUTS SEG NFR BLD: 56 % (ref 43–78)
NRBC # BLD: 0 K/UL (ref 0–0.2)
NT PRO BNP: 166 PG/ML (ref 0–125)
PLATELET # BLD AUTO: 275 K/UL (ref 150–450)
PMV BLD AUTO: 9 FL (ref 9.4–12.3)
POTASSIUM SERPL-SCNC: 4.6 MMOL/L (ref 3.5–5.1)
PROT SERPL-MCNC: 7.2 G/DL (ref 6.3–8.2)
RBC # BLD AUTO: 3.97 M/UL (ref 4.05–5.2)
SERVICE CMNT-IMP: ABNORMAL
SODIUM SERPL-SCNC: 139 MMOL/L (ref 136–145)
TROPONIN T SERPL HS-MCNC: <6 NG/L (ref 0–14)
TROPONIN T SERPL HS-MCNC: <6 NG/L (ref 0–14)
WBC # BLD AUTO: 6.2 K/UL (ref 4.3–11.1)

## 2024-11-17 PROCEDURE — 94762 N-INVAS EAR/PLS OXIMTRY CONT: CPT

## 2024-11-17 PROCEDURE — 82962 GLUCOSE BLOOD TEST: CPT

## 2024-11-17 PROCEDURE — 99285 EMERGENCY DEPT VISIT HI MDM: CPT

## 2024-11-17 PROCEDURE — 71046 X-RAY EXAM CHEST 2 VIEWS: CPT

## 2024-11-17 PROCEDURE — 6360000002 HC RX W HCPCS

## 2024-11-17 PROCEDURE — 6370000000 HC RX 637 (ALT 250 FOR IP)

## 2024-11-17 PROCEDURE — 71260 CT THORAX DX C+: CPT

## 2024-11-17 PROCEDURE — 93005 ELECTROCARDIOGRAM TRACING: CPT

## 2024-11-17 PROCEDURE — 96374 THER/PROPH/DIAG INJ IV PUSH: CPT

## 2024-11-17 PROCEDURE — 6360000004 HC RX CONTRAST MEDICATION

## 2024-11-17 PROCEDURE — 85379 FIBRIN DEGRADATION QUANT: CPT

## 2024-11-17 PROCEDURE — 84484 ASSAY OF TROPONIN QUANT: CPT

## 2024-11-17 PROCEDURE — 85025 COMPLETE CBC W/AUTO DIFF WBC: CPT

## 2024-11-17 PROCEDURE — 83880 ASSAY OF NATRIURETIC PEPTIDE: CPT

## 2024-11-17 PROCEDURE — 80053 COMPREHEN METABOLIC PANEL: CPT

## 2024-11-17 RX ORDER — IOPAMIDOL 755 MG/ML
100 INJECTION, SOLUTION INTRAVASCULAR
Status: COMPLETED | OUTPATIENT
Start: 2024-11-17 | End: 2024-11-17

## 2024-11-17 RX ORDER — ASPIRIN 81 MG/1
324 TABLET, CHEWABLE ORAL DAILY
Status: DISCONTINUED | OUTPATIENT
Start: 2024-11-17 | End: 2024-11-17 | Stop reason: HOSPADM

## 2024-11-17 RX ORDER — ONDANSETRON 2 MG/ML
4 INJECTION INTRAMUSCULAR; INTRAVENOUS
Status: COMPLETED | OUTPATIENT
Start: 2024-11-17 | End: 2024-11-17

## 2024-11-17 RX ADMIN — ASPIRIN 324 MG: 81 TABLET, CHEWABLE ORAL at 11:29

## 2024-11-17 RX ADMIN — IOPAMIDOL 100 ML: 755 INJECTION, SOLUTION INTRAVENOUS at 16:20

## 2024-11-17 RX ADMIN — ONDANSETRON 4 MG: 2 INJECTION INTRAMUSCULAR; INTRAVENOUS at 11:29

## 2024-11-17 ASSESSMENT — LIFESTYLE VARIABLES
HOW OFTEN DO YOU HAVE A DRINK CONTAINING ALCOHOL: NEVER
HOW MANY STANDARD DRINKS CONTAINING ALCOHOL DO YOU HAVE ON A TYPICAL DAY: PATIENT DOES NOT DRINK

## 2024-11-17 ASSESSMENT — PAIN - FUNCTIONAL ASSESSMENT: PAIN_FUNCTIONAL_ASSESSMENT: 0-10

## 2024-11-17 ASSESSMENT — PAIN SCALES - GENERAL
PAINLEVEL_OUTOF10: 3
PAINLEVEL_OUTOF10: 0
PAINLEVEL_OUTOF10: 5

## 2024-11-17 NOTE — ED NOTES
Patient mobility status  with no difficulty.     I have reviewed discharge instructions with the patient.  The patient verbalized understanding.    Patient left ED via Discharge Method: ambulatory to Home with .    Opportunity for questions and clarification provided.     Patient given 0 scripts.           Maribel Cherry RN  11/17/24 [id number]

## 2024-11-17 NOTE — ED PROVIDER NOTES
signed by Danis Mcintosh   CBC with Auto Differential   Result Value Ref Range    WBC 6.2 4.3 - 11.1 K/uL    RBC 3.97 (L) 4.05 - 5.2 M/uL    Hemoglobin 11.2 (L) 11.7 - 15.4 g/dL    Hematocrit 34.4 (L) 35.8 - 46.3 %    MCV 86.6 82 - 102 FL    MCH 28.2 26.1 - 32.9 PG    MCHC 32.6 31.4 - 35.0 g/dL    RDW 12.8 11.9 - 14.6 %    Platelets 275 150 - 450 K/uL    MPV 9.0 (L) 9.4 - 12.3 FL    nRBC 0.00 0.0 - 0.2 K/uL    Differential Type AUTOMATED      Neutrophils % 56 43 - 78 %    Lymphocytes % 32 13 - 44 %    Monocytes % 6 4.0 - 12.0 %    Eosinophils % 4 0.5 - 7.8 %    Basophils % 1 0.0 - 2.0 %    Immature Granulocytes % 1 0.0 - 5.0 %    Neutrophils Absolute 3.5 1.7 - 8.2 K/UL    Lymphocytes Absolute 2.0 0.5 - 4.6 K/UL    Monocytes Absolute 0.4 0.1 - 1.3 K/UL    Eosinophils Absolute 0.2 0.0 - 0.8 K/UL    Basophils Absolute 0.1 0.0 - 0.2 K/UL    Immature Granulocytes Absolute 0.0 0.0 - 0.5 K/UL   Troponin   Result Value Ref Range    Troponin T <6.0 0 - 14 ng/L   CMP   Result Value Ref Range    Sodium 139 136 - 145 mmol/L    Potassium 4.6 3.5 - 5.1 mmol/L    Chloride 105 98 - 107 mmol/L    CO2 21 20 - 29 mmol/L    Anion Gap 13 7 - 16 mmol/L    Glucose 160 (H) 70 - 99 mg/dL    BUN 19 8 - 23 MG/DL    Creatinine 0.97 0.60 - 1.10 MG/DL    Est, Glom Filt Rate 63 >60 ml/min/1.73m2    Calcium 9.7 8.8 - 10.2 MG/DL    Total Bilirubin 0.3 0.0 - 1.2 MG/DL    ALT 13 8 - 45 U/L    AST 26 15 - 37 U/L    Alk Phosphatase 56 35 - 104 U/L    Total Protein 7.2 6.3 - 8.2 g/dL    Albumin 4.0 3.2 - 4.6 g/dL    Globulin 3.2 2.3 - 3.5 g/dL    Albumin/Globulin Ratio 1.2 1.0 - 1.9     Brain Natriuretic Peptide   Result Value Ref Range    NT Pro- (H) 0 - 125 PG/ML   Troponin   Result Value Ref Range    Troponin T <6.0 0 - 14 ng/L   D-Dimer, Quantitative   Result Value Ref Range    D-Dimer, Quant 0.64 (H) <0.56 ug/ml(FEU)   POCT Glucose   Result Value Ref Range    POC Glucose 126 (H) 65 - 100 mg/dL    Performed by: Laura    EKG 12

## 2024-11-17 NOTE — ED TRIAGE NOTES
Patient arrived with a complaining chest pain started not too long ago. Patient was diaphoretic. Non radiating. No shortness of breath,  Nausea present.    History of CHF

## 2024-11-17 NOTE — DISCHARGE INSTRUCTIONS
As we discussed, your cardiac workup did not reveal any emergent process here today.  Your cardiac enzymes were normal.  Your x-ray and CT scan did not show any abnormalities upon reviewing your chart it looks like you have multiple refills of nitroglycerin at your pharmacy and [facility].  I would call to get a refill of your nitroglycerin.  I do want you to follow-up with your primary cardiologist since you had an emergency room visit here for chest pain.  As we discussed, please return to the emergency department for any new, worsening, concerning symptoms

## 2024-11-18 LAB
EKG ATRIAL RATE: 54 BPM
EKG DIAGNOSIS: NORMAL
EKG P AXIS: 42 DEGREES
EKG P-R INTERVAL: 154 MS
EKG Q-T INTERVAL: 470 MS
EKG QRS DURATION: 80 MS
EKG QTC CALCULATION (BAZETT): 445 MS
EKG R AXIS: 5 DEGREES
EKG T AXIS: 42 DEGREES
EKG VENTRICULAR RATE: 54 BPM

## 2024-11-18 PROCEDURE — 93010 ELECTROCARDIOGRAM REPORT: CPT | Performed by: INTERNAL MEDICINE

## 2024-12-17 ENCOUNTER — OFFICE VISIT (OUTPATIENT)
Age: 69
End: 2024-12-17
Payer: MEDICARE

## 2024-12-17 VITALS
HEIGHT: 65 IN | BODY MASS INDEX: 23.69 KG/M2 | SYSTOLIC BLOOD PRESSURE: 126 MMHG | WEIGHT: 142.2 LBS | DIASTOLIC BLOOD PRESSURE: 70 MMHG | HEART RATE: 79 BPM

## 2024-12-17 DIAGNOSIS — I25.5 ISCHEMIC CARDIOMYOPATHY: ICD-10-CM

## 2024-12-17 DIAGNOSIS — I47.9 PAROXYSMAL TACHYCARDIA (HCC): Primary | ICD-10-CM

## 2024-12-17 DIAGNOSIS — I50.22 CHRONIC SYSTOLIC CHF (CONGESTIVE HEART FAILURE) (HCC): ICD-10-CM

## 2024-12-17 PROCEDURE — G8484 FLU IMMUNIZE NO ADMIN: HCPCS | Performed by: INTERNAL MEDICINE

## 2024-12-17 PROCEDURE — 1090F PRES/ABSN URINE INCON ASSESS: CPT | Performed by: INTERNAL MEDICINE

## 2024-12-17 PROCEDURE — 3078F DIAST BP <80 MM HG: CPT | Performed by: INTERNAL MEDICINE

## 2024-12-17 PROCEDURE — G8427 DOCREV CUR MEDS BY ELIG CLIN: HCPCS | Performed by: INTERNAL MEDICINE

## 2024-12-17 PROCEDURE — 1159F MED LIST DOCD IN RCRD: CPT | Performed by: INTERNAL MEDICINE

## 2024-12-17 PROCEDURE — 3074F SYST BP LT 130 MM HG: CPT | Performed by: INTERNAL MEDICINE

## 2024-12-17 PROCEDURE — 1126F AMNT PAIN NOTED NONE PRSNT: CPT | Performed by: INTERNAL MEDICINE

## 2024-12-17 PROCEDURE — G8420 CALC BMI NORM PARAMETERS: HCPCS | Performed by: INTERNAL MEDICINE

## 2024-12-17 PROCEDURE — 1160F RVW MEDS BY RX/DR IN RCRD: CPT | Performed by: INTERNAL MEDICINE

## 2024-12-17 PROCEDURE — G8400 PT W/DXA NO RESULTS DOC: HCPCS | Performed by: INTERNAL MEDICINE

## 2024-12-17 PROCEDURE — 99214 OFFICE O/P EST MOD 30 MIN: CPT | Performed by: INTERNAL MEDICINE

## 2024-12-17 PROCEDURE — 1123F ACP DISCUSS/DSCN MKR DOCD: CPT | Performed by: INTERNAL MEDICINE

## 2024-12-17 PROCEDURE — 3017F COLORECTAL CA SCREEN DOC REV: CPT | Performed by: INTERNAL MEDICINE

## 2024-12-17 PROCEDURE — 93000 ELECTROCARDIOGRAM COMPLETE: CPT | Performed by: INTERNAL MEDICINE

## 2024-12-17 PROCEDURE — 1036F TOBACCO NON-USER: CPT | Performed by: INTERNAL MEDICINE

## 2024-12-17 RX ORDER — NITROGLYCERIN 0.4 MG/1
0.4 TABLET SUBLINGUAL EVERY 5 MIN PRN
Qty: 25 TABLET | Refills: 3 | Status: SHIPPED | OUTPATIENT
Start: 2024-12-17

## 2024-12-17 ASSESSMENT — ENCOUNTER SYMPTOMS
CONSTIPATION: 0
PHOTOPHOBIA: 0
SORE THROAT: 0
ABDOMINAL DISTENTION: 0
DIARRHEA: 0
SHORTNESS OF BREATH: 0
ABDOMINAL PAIN: 0
COUGH: 1

## 2024-12-17 NOTE — PROGRESS NOTES
[facility] CARDIOLOGY  [address] [place] [address], [address]  [place] [address]  PHONE: [phone]        NAME:  Liudmila Escoto  : 1955  MRN: [id number]     PCP:  Carlos Elizabeth MD      SUBJECTIVE:   Liudmila Escoto is a 68 y.o. female seen for a follow up visit regarding the following:     Chief Complaint   Patient presents with    Chronic systolic CHF (congestive heart failure)    Paroxysmal tachycardia,       HPI:    Previously cared for by Dr. Deshpande with known CAD s/p PCI Ramus in 2018 and multiple cardiac risk factors action fraction chronically mildly depressed at 40 to 45% but no recent heart failure symptoms whatsoever and medically maximized... Staying active without any significant limitations other than knee pain, s/p TKR with Dr. Sims without adverse cardiac event.       Echo 2022:    Left Ventricle: Left ventricle size is normal. Normal wall thickness. Normal wall motion. Mildly reduced left ventricular systolic function with a visually estimated EF of 45 - 50%. Grade I diastolic dysfunction with normal LAP.    Left Atrium: Left atrium is moderately dilated.    Mitral Valve: Mild transvalvular regurgitation.    Echo 2023:  Left Ventricle Low normal left ventricular systolic function with a visually estimated EF of 50 - 55%. Left ventricle size is normal. Normal wall thickness. Normal wall motion. Abnormal diastolic function.   Left Atrium Left atrium is mildly dilated.   Right Ventricle Right ventricle size is normal. Normal systolic function.   Right Atrium Right atrium is mildly dilated.   Aortic Valve Sclerosis of the aortic valve cusp. Trace regurgitation. No stenosis.   Mitral Valve Valve structure is normal. Mild to moderate regurgitation. No stenosis noted.   Tricuspid Valve Valve structure is normal. Mild regurgitation. No stenosis noted. The estimated RVSP is 31 mmHg.   Pulmonic Valve Valve structure is normal. Mild regurgitation. No stenosis noted.

## 2025-01-06 ENCOUNTER — TELEPHONE (OUTPATIENT)
Age: 70
End: 2025-01-06

## 2025-01-06 NOTE — TELEPHONE ENCOUNTER
----- Message from Dr. Brent Sanders MD sent at 1/6/2025  7:38 AM EST -----  Lab reviewed.  Everything looks good.  Continue meds, healthy diet and lifestyle and keep routine follow-up.

## 2025-01-19 NOTE — PROGRESS NOTES
for: \"VLDL\"  No results found for: \"CHOLHDLRATIO\"     Lab Results   Component Value Date/Time     11/17/2024 10:36 AM    K 4.6 11/17/2024 10:36 AM     11/17/2024 10:36 AM    CO2 21 11/17/2024 10:36 AM    BUN 19 11/17/2024 10:36 AM    CREATININE 0.97 11/17/2024 10:36 AM    GLUCOSE 160 11/17/2024 10:36 AM    CALCIUM 9.7 11/17/2024 10:36 AM         No results for input(s): \"WBC\", \"HGB\", \"HCT\", \"MCV\", \"PLT\" in the last 720 hours.     Lab Results   Component Value Date    LABA1C 6.5 08/17/2024     No results found for: \"EAG\"     No results found for: \"BNP\"     No results found for: \"TSHFT4\", \"TSH\"     No results found for any visits on 01/21/25.    PCP labs 12/24 reviewed by me:  Scr 0.91  ALT 20  AST 22  CHOL 116  TRIG 94  HDL 37  LDL 62  A1C 6.4  HGB 11.8      ASSESSMENT and PLAN:    Diagnoses and all orders for this visit:      1. S/P PTCA (percutaneous transluminal coronary angioplasty) - PCI Ramus 9/2018 - stable, continue meds- off Brilinta now.  Continue lifelong ASA 81mg daily as tolerated.  Lipid surveillance per Dr. Elizabeth.  We will request a copy of next lab draw.      2. Type 2 diabetes mellitus without complication  (HCC) - stable, continue meds but she will fill out patient assistance forms for Jardiance, initiate therapy if she qualifies. Cost will be an issue if not a candidate for patient assistance.       3. Chronic systolic CHF (congestive heart failure) (HCC) - stable, continue carvedilol and entresto at current doses - medically maximized and clinically euvolemic-echo looks good, see above      4. Mixed hyperlipidemia- stable, continue rosuvastatin, see above- request future copies of labs from Dr. Elizabeth's office      5. Essential hypertension with goal blood pressure less than 130/85 - stable, continue entresto and coreg at present doses, medically maximized.  See below    6.  Druze Syncope - Nov 2024.  Negative ER evaluation.  Labs stable, ECG stable, chest x-ray and CTA stable.  No

## 2025-01-21 ENCOUNTER — OFFICE VISIT (OUTPATIENT)
Age: 70
End: 2025-01-21
Payer: MEDICARE

## 2025-01-21 VITALS
HEART RATE: 74 BPM | BODY MASS INDEX: 23.79 KG/M2 | HEIGHT: 65 IN | SYSTOLIC BLOOD PRESSURE: 126 MMHG | DIASTOLIC BLOOD PRESSURE: 72 MMHG | WEIGHT: 142.8 LBS

## 2025-01-21 DIAGNOSIS — I47.9 PAROXYSMAL TACHYCARDIA (HCC): ICD-10-CM

## 2025-01-21 DIAGNOSIS — E11.9 TYPE 2 DIABETES MELLITUS WITHOUT COMPLICATION, WITHOUT LONG-TERM CURRENT USE OF INSULIN (HCC): ICD-10-CM

## 2025-01-21 DIAGNOSIS — I10 ESSENTIAL HYPERTENSION WITH GOAL BLOOD PRESSURE LESS THAN 130/85: ICD-10-CM

## 2025-01-21 DIAGNOSIS — I25.5 ISCHEMIC CARDIOMYOPATHY: ICD-10-CM

## 2025-01-21 DIAGNOSIS — I50.22 CHRONIC SYSTOLIC CHF (CONGESTIVE HEART FAILURE) (HCC): Primary | ICD-10-CM

## 2025-01-21 PROCEDURE — 3046F HEMOGLOBIN A1C LEVEL >9.0%: CPT | Performed by: INTERNAL MEDICINE

## 2025-01-21 PROCEDURE — 1123F ACP DISCUSS/DSCN MKR DOCD: CPT | Performed by: INTERNAL MEDICINE

## 2025-01-21 PROCEDURE — 2022F DILAT RTA XM EVC RTNOPTHY: CPT | Performed by: INTERNAL MEDICINE

## 2025-01-21 PROCEDURE — G8427 DOCREV CUR MEDS BY ELIG CLIN: HCPCS | Performed by: INTERNAL MEDICINE

## 2025-01-21 PROCEDURE — 3017F COLORECTAL CA SCREEN DOC REV: CPT | Performed by: INTERNAL MEDICINE

## 2025-01-21 PROCEDURE — 99214 OFFICE O/P EST MOD 30 MIN: CPT | Performed by: INTERNAL MEDICINE

## 2025-01-21 PROCEDURE — 3074F SYST BP LT 130 MM HG: CPT | Performed by: INTERNAL MEDICINE

## 2025-01-21 PROCEDURE — 1126F AMNT PAIN NOTED NONE PRSNT: CPT | Performed by: INTERNAL MEDICINE

## 2025-01-21 PROCEDURE — 1036F TOBACCO NON-USER: CPT | Performed by: INTERNAL MEDICINE

## 2025-01-21 PROCEDURE — 1159F MED LIST DOCD IN RCRD: CPT | Performed by: INTERNAL MEDICINE

## 2025-01-21 PROCEDURE — G8420 CALC BMI NORM PARAMETERS: HCPCS | Performed by: INTERNAL MEDICINE

## 2025-01-21 PROCEDURE — 3078F DIAST BP <80 MM HG: CPT | Performed by: INTERNAL MEDICINE

## 2025-01-21 PROCEDURE — G8400 PT W/DXA NO RESULTS DOC: HCPCS | Performed by: INTERNAL MEDICINE

## 2025-01-21 PROCEDURE — 1090F PRES/ABSN URINE INCON ASSESS: CPT | Performed by: INTERNAL MEDICINE

## 2025-01-21 RX ORDER — SPIRONOLACTONE 25 MG/1
12.5 TABLET ORAL DAILY
Qty: 45 TABLET | Refills: 3 | Status: SHIPPED | OUTPATIENT
Start: 2025-01-21

## 2025-01-21 RX ORDER — CARVEDILOL 6.25 MG/1
6.25 TABLET ORAL 2 TIMES DAILY WITH MEALS
Qty: 180 TABLET | Refills: 3 | Status: SHIPPED | OUTPATIENT
Start: 2025-01-21

## 2025-01-21 RX ORDER — SACUBITRIL AND VALSARTAN 24; 26 MG/1; MG/1
1 TABLET, FILM COATED ORAL 2 TIMES DAILY
Qty: 180 TABLET | Refills: 3 | Status: SHIPPED | OUTPATIENT
Start: 2025-01-21

## 2025-01-21 ASSESSMENT — ENCOUNTER SYMPTOMS: COUGH: 0

## 2025-07-19 NOTE — PROGRESS NOTES
[facility] CARDIOLOGY  [address] [place] [address], [address]  [place] [address]  PHONE: [phone]        NAME:  Liudmila Escoto  : 1955  MRN: [id number]     PCP:  Carlos Elizabeth MD      SUBJECTIVE:   Liudmila Escoto is a 69 y.o. female seen for a follow up visit regarding the following:     Chief Complaint   Patient presents with    Congestive Heart Failure       HPI:    Previously cared for by Dr. Deshpande with known CAD s/p PCI Ramus in 2018 and multiple cardiac risk factors action fraction chronically mildly depressed at 40 to 45% but no recent heart failure symptoms whatsoever and medically maximized... Staying active and back working without limitations or exertional symptoms.      Echo 2022:    Left Ventricle: Left ventricle size is normal. Normal wall thickness. Normal wall motion. Mildly reduced left ventricular systolic function with a visually estimated EF of 45 - 50%. Grade I diastolic dysfunction with normal LAP.    Left Atrium: Left atrium is moderately dilated.    Mitral Valve: Mild transvalvular regurgitation.    Had Restorationist syncope/near syncope 2024, evaluated at [facility] with negative imaging and negative labs.  No recurrence.  Active taking care of her house and shopping for [employer] with no limitations or exertional symptoms since that time.    Clinically euvolemic today.  Compliant with medications and looks good today.     She is medically maximized and will continue GDMT for CHF without change today.  Having trouble with cost of entresto but will continue.  Consider jardiance in future, but with possible hypoglycemia recently we will avoid for now.  EF has essentially normalized as well, no urgent need for jardiance at present from CV standpoint.     Discussed lifestyle modification with plans for low carb/low sugar/low fat diet.  Try to eat more lean protein, vegetables, and salads.  Try to get at least 20-30min moderate intensity cardiovascular

## 2025-07-22 ENCOUNTER — OFFICE VISIT (OUTPATIENT)
Age: 70
End: 2025-07-22
Payer: MEDICARE

## 2025-07-22 VITALS
HEIGHT: 65 IN | SYSTOLIC BLOOD PRESSURE: 120 MMHG | HEART RATE: 68 BPM | BODY MASS INDEX: 23.32 KG/M2 | DIASTOLIC BLOOD PRESSURE: 70 MMHG | WEIGHT: 140 LBS

## 2025-07-22 DIAGNOSIS — E78.2 MIXED HYPERLIPIDEMIA: ICD-10-CM

## 2025-07-22 DIAGNOSIS — I50.22 CHRONIC SYSTOLIC CHF (CONGESTIVE HEART FAILURE) (HCC): Primary | ICD-10-CM

## 2025-07-22 DIAGNOSIS — Z98.61 S/P PTCA (PERCUTANEOUS TRANSLUMINAL CORONARY ANGIOPLASTY): ICD-10-CM

## 2025-07-22 DIAGNOSIS — E11.9 TYPE 2 DIABETES MELLITUS WITHOUT COMPLICATION, WITHOUT LONG-TERM CURRENT USE OF INSULIN (HCC): ICD-10-CM

## 2025-07-22 DIAGNOSIS — I47.9 PAROXYSMAL TACHYCARDIA (HCC): ICD-10-CM

## 2025-07-22 DIAGNOSIS — I10 ESSENTIAL HYPERTENSION WITH GOAL BLOOD PRESSURE LESS THAN 130/85: ICD-10-CM

## 2025-07-22 PROCEDURE — 1090F PRES/ABSN URINE INCON ASSESS: CPT | Performed by: INTERNAL MEDICINE

## 2025-07-22 PROCEDURE — G8400 PT W/DXA NO RESULTS DOC: HCPCS | Performed by: INTERNAL MEDICINE

## 2025-07-22 PROCEDURE — G8420 CALC BMI NORM PARAMETERS: HCPCS | Performed by: INTERNAL MEDICINE

## 2025-07-22 PROCEDURE — 3046F HEMOGLOBIN A1C LEVEL >9.0%: CPT | Performed by: INTERNAL MEDICINE

## 2025-07-22 PROCEDURE — 1159F MED LIST DOCD IN RCRD: CPT | Performed by: INTERNAL MEDICINE

## 2025-07-22 PROCEDURE — 1160F RVW MEDS BY RX/DR IN RCRD: CPT | Performed by: INTERNAL MEDICINE

## 2025-07-22 PROCEDURE — 1123F ACP DISCUSS/DSCN MKR DOCD: CPT | Performed by: INTERNAL MEDICINE

## 2025-07-22 PROCEDURE — 1126F AMNT PAIN NOTED NONE PRSNT: CPT | Performed by: INTERNAL MEDICINE

## 2025-07-22 PROCEDURE — 3074F SYST BP LT 130 MM HG: CPT | Performed by: INTERNAL MEDICINE

## 2025-07-22 PROCEDURE — 99214 OFFICE O/P EST MOD 30 MIN: CPT | Performed by: INTERNAL MEDICINE

## 2025-07-22 PROCEDURE — 3017F COLORECTAL CA SCREEN DOC REV: CPT | Performed by: INTERNAL MEDICINE

## 2025-07-22 PROCEDURE — 3078F DIAST BP <80 MM HG: CPT | Performed by: INTERNAL MEDICINE

## 2025-07-22 PROCEDURE — 1036F TOBACCO NON-USER: CPT | Performed by: INTERNAL MEDICINE

## 2025-07-22 PROCEDURE — 2022F DILAT RTA XM EVC RTNOPTHY: CPT | Performed by: INTERNAL MEDICINE

## 2025-07-22 PROCEDURE — G8427 DOCREV CUR MEDS BY ELIG CLIN: HCPCS | Performed by: INTERNAL MEDICINE

## 2025-07-22 RX ORDER — SPIRONOLACTONE 25 MG/1
12.5 TABLET ORAL DAILY
Qty: 45 TABLET | Refills: 3 | Status: SHIPPED | OUTPATIENT
Start: 2025-07-22

## 2025-07-22 RX ORDER — SACUBITRIL AND VALSARTAN 24; 26 MG/1; MG/1
1 TABLET, FILM COATED ORAL 2 TIMES DAILY
Qty: 180 TABLET | Refills: 3 | Status: SHIPPED | OUTPATIENT
Start: 2025-07-22

## 2025-07-22 RX ORDER — CARVEDILOL 6.25 MG/1
6.25 TABLET ORAL 2 TIMES DAILY WITH MEALS
Qty: 180 TABLET | Refills: 3 | Status: SHIPPED | OUTPATIENT
Start: 2025-07-22

## (undated) DEVICE — Device

## (undated) DEVICE — GLOVE SURG SZ 6 THK91MIL LTX FREE SYN POLYISOPRENE ANTI

## (undated) DEVICE — GLOVE SURG SZ 6 L12IN FNGR THK79MIL GRN LTX FREE

## (undated) DEVICE — CONTROL SYRINGE LUER-LOCK TIP: Brand: MONOJECT

## (undated) DEVICE — CYSTO/BLADDER IRRIGATION SET, REGULATING CLAMP

## (undated) DEVICE — SOLUTION SCRB 0.04 CHG DYNA HEX

## (undated) DEVICE — HOOK RETRCT L5MM E SHRP SELF RET SYS LONE STAR

## (undated) DEVICE — SUTURE PDS II SZ 2-0 L27IN ABSRB VLT L36MM CT-1 1/2 CIR Z339H

## (undated) DEVICE — SUTURE ABSORBABLE BRAIDED 2-0 CT-1 27 IN UD VICRYL J259H

## (undated) DEVICE — SUTURE CAPTURING DEVICE: Brand: CAPIO SLIM

## (undated) DEVICE — RETRACTOR SURG W18.3XL32.5CM PLAS SELF RET W/ 2 CATH CLP

## (undated) DEVICE — SOLUTION IRRIG 1000ML 0.9% SOD CHL USP POUR PLAS BTL

## (undated) DEVICE — PAD PT POS 36 IN SURGYPAD DISP

## (undated) DEVICE — LITHOTOMY: Brand: MEDLINE INDUSTRIES, INC.

## (undated) DEVICE — SUTURE CAPIO SZ 0 L48IN NONABSOR TAPR CUT NDL MONOFILAMENT

## (undated) DEVICE — TUBING, SUCTION, 1/4" X 10', STRAIGHT: Brand: MEDLINE

## (undated) DEVICE — SOLUTION IRRIGATION H2O 0797305] ICU MEDICAL INC]

## (undated) DEVICE — INTENDED FOR TISSUE SEPARATION, AND OTHER PROCEDURES THAT REQUIRE A SHARP SURGICAL BLADE TO PUNCTURE OR CUT.: Brand: BARD-PARKER ® STAINLESS STEEL BLADES

## (undated) DEVICE — COVER,MAYO STAND,STERILE: Brand: MEDLINE

## (undated) DEVICE — SOLUTION IV 100ML 0.9% SOD CHL PLAS CONT USP VIAFLX 1 PER

## (undated) DEVICE — INTENT TO BE USED WITH SUTURE MATERIAL FOR TISSUE CLOSURE: Brand: RICHARD-ALLAN® NEEDLE 3/8 CIRCLE TROCAR

## (undated) DEVICE — GARMENT,MEDLINE,DVT,INT,CALF,MED, GEN2: Brand: MEDLINE

## (undated) DEVICE — CANISTER, RIGID, 2000CC: Brand: MEDLINE INDUSTRIES, INC.